# Patient Record
Sex: FEMALE | Race: WHITE | Employment: OTHER | URBAN - METROPOLITAN AREA
[De-identification: names, ages, dates, MRNs, and addresses within clinical notes are randomized per-mention and may not be internally consistent; named-entity substitution may affect disease eponyms.]

---

## 2023-07-24 ENCOUNTER — OFFICE VISIT (OUTPATIENT)
Dept: INTERNAL MEDICINE CLINIC | Facility: CLINIC | Age: 63
End: 2023-07-24
Payer: MEDICARE

## 2023-07-24 VITALS
OXYGEN SATURATION: 99 % | SYSTOLIC BLOOD PRESSURE: 100 MMHG | TEMPERATURE: 98 F | WEIGHT: 110.19 LBS | HEART RATE: 71 BPM | BODY MASS INDEX: 17.71 KG/M2 | DIASTOLIC BLOOD PRESSURE: 60 MMHG | RESPIRATION RATE: 16 BRPM | HEIGHT: 66 IN

## 2023-07-24 DIAGNOSIS — F32.5 MAJOR DEPRESSIVE DISORDER WITH SINGLE EPISODE, IN FULL REMISSION (HCC): ICD-10-CM

## 2023-07-24 DIAGNOSIS — H61.23 BILATERAL IMPACTED CERUMEN: ICD-10-CM

## 2023-07-24 DIAGNOSIS — M79.641 BILATERAL HAND PAIN: ICD-10-CM

## 2023-07-24 DIAGNOSIS — Z78.0 POSTMENOPAUSAL ESTROGEN DEFICIENCY: ICD-10-CM

## 2023-07-24 DIAGNOSIS — M79.642 BILATERAL HAND PAIN: ICD-10-CM

## 2023-07-24 DIAGNOSIS — Z23 NEED FOR VACCINATION AGAINST STREPTOCOCCUS PNEUMONIAE: ICD-10-CM

## 2023-07-24 DIAGNOSIS — Z00.00 WELLNESS EXAMINATION: Primary | ICD-10-CM

## 2023-07-24 DIAGNOSIS — S76.319A: ICD-10-CM

## 2023-07-24 DIAGNOSIS — Z12.31 SCREENING MAMMOGRAM FOR BREAST CANCER: ICD-10-CM

## 2023-07-24 DIAGNOSIS — Z12.83 SKIN CANCER SCREENING: ICD-10-CM

## 2023-07-24 LAB
ATRIAL RATE: 62 BPM
P AXIS: 81 DEGREES
P-R INTERVAL: 158 MS
Q-T INTERVAL: 420 MS
QRS DURATION: 86 MS
QTC CALCULATION (BEZET): 426 MS
R AXIS: 78 DEGREES
T AXIS: 62 DEGREES
VENTRICULAR RATE: 62 BPM

## 2023-07-24 PROCEDURE — 3008F BODY MASS INDEX DOCD: CPT | Performed by: INTERNAL MEDICINE

## 2023-07-24 PROCEDURE — 3078F DIAST BP <80 MM HG: CPT | Performed by: INTERNAL MEDICINE

## 2023-07-24 PROCEDURE — 90677 PCV20 VACCINE IM: CPT | Performed by: INTERNAL MEDICINE

## 2023-07-24 PROCEDURE — G0402 INITIAL PREVENTIVE EXAM: HCPCS | Performed by: INTERNAL MEDICINE

## 2023-07-24 PROCEDURE — G0403 EKG FOR INITIAL PREVENT EXAM: HCPCS | Performed by: INTERNAL MEDICINE

## 2023-07-24 PROCEDURE — 96160 PT-FOCUSED HLTH RISK ASSMT: CPT | Performed by: INTERNAL MEDICINE

## 2023-07-24 PROCEDURE — 3074F SYST BP LT 130 MM HG: CPT | Performed by: INTERNAL MEDICINE

## 2023-07-24 PROCEDURE — G0009 ADMIN PNEUMOCOCCAL VACCINE: HCPCS | Performed by: INTERNAL MEDICINE

## 2023-07-24 RX ORDER — BUPROPION HYDROCHLORIDE 300 MG/1
300 TABLET ORAL EVERY MORNING
COMMUNITY
Start: 2023-02-11 | End: 2023-07-24

## 2023-07-24 RX ORDER — CHOLECALCIFEROL (VITAMIN D3) 50 MCG
1 TABLET ORAL DAILY
COMMUNITY

## 2023-07-24 RX ORDER — METRONIDAZOLE 7.5 MG/G
GEL TOPICAL AS NEEDED
COMMUNITY

## 2023-07-24 RX ORDER — BUPROPION HYDROCHLORIDE 300 MG/1
300 TABLET ORAL EVERY MORNING
Qty: 90 TABLET | Refills: 3 | Status: SHIPPED | OUTPATIENT
Start: 2023-07-24

## 2023-07-24 NOTE — PATIENT INSTRUCTIONS
Flu shot and COVID vaccine this fall    Tdap tetanus shot recommended-get at pharmacy    Shingrix shingles shot recommended - get at pharmacy

## 2023-07-25 ENCOUNTER — TELEPHONE (OUTPATIENT)
Dept: INTERNAL MEDICINE CLINIC | Facility: CLINIC | Age: 63
End: 2023-07-25

## 2023-07-25 NOTE — TELEPHONE ENCOUNTER
Signed ROR faxed to patients previous PCP Jacob Maldonado MD to obtain records   Confirmation received   Sent to scan and copy placed in accordion     Awaiting records

## 2023-07-29 PROBLEM — M79.641 BILATERAL HAND PAIN: Status: ACTIVE | Noted: 2023-07-29

## 2023-07-29 PROBLEM — F32.A DEPRESSION: Status: ACTIVE | Noted: 2023-07-29

## 2023-07-29 PROBLEM — M79.642 BILATERAL HAND PAIN: Status: ACTIVE | Noted: 2023-07-29

## 2023-07-29 PROBLEM — H61.23 BILATERAL IMPACTED CERUMEN: Status: ACTIVE | Noted: 2023-07-29

## 2023-07-29 PROBLEM — S76.319A STRAIN OF PIRIFORMIS MUSCLE: Status: ACTIVE | Noted: 2023-07-29

## 2023-07-31 ENCOUNTER — TELEPHONE (OUTPATIENT)
Dept: PHYSICAL THERAPY | Facility: HOSPITAL | Age: 63
End: 2023-07-31

## 2023-08-02 ENCOUNTER — TELEPHONE (OUTPATIENT)
Dept: PHYSICAL THERAPY | Facility: HOSPITAL | Age: 63
End: 2023-08-02

## 2023-08-09 ENCOUNTER — OFFICE VISIT (OUTPATIENT)
Dept: PHYSICAL THERAPY | Age: 63
End: 2023-08-09
Attending: INTERNAL MEDICINE
Payer: MEDICARE

## 2023-08-09 PROCEDURE — 97110 THERAPEUTIC EXERCISES: CPT

## 2023-08-09 PROCEDURE — 97161 PT EVAL LOW COMPLEX 20 MIN: CPT

## 2023-08-21 ENCOUNTER — OFFICE VISIT (OUTPATIENT)
Dept: PHYSICAL THERAPY | Age: 63
End: 2023-08-21
Attending: INTERNAL MEDICINE
Payer: MEDICARE

## 2023-08-21 ENCOUNTER — HOSPITAL ENCOUNTER (OUTPATIENT)
Dept: BONE DENSITY | Age: 63
Discharge: HOME | End: 2023-08-21
Attending: INTERNAL MEDICINE
Payer: MEDICARE

## 2023-08-21 ENCOUNTER — HOSPITAL ENCOUNTER (OUTPATIENT)
Dept: MAMMOGRAPHY | Age: 63
End: 2023-08-21
Attending: INTERNAL MEDICINE
Payer: MEDICARE

## 2023-08-21 DIAGNOSIS — M79.641 BILATERAL HAND PAIN: Primary | ICD-10-CM

## 2023-08-21 DIAGNOSIS — M79.642 BILATERAL HAND PAIN: Primary | ICD-10-CM

## 2023-08-21 DIAGNOSIS — Z78.0 POSTMENOPAUSAL ESTROGEN DEFICIENCY: ICD-10-CM

## 2023-08-21 DIAGNOSIS — S76.319A: ICD-10-CM

## 2023-08-21 PROCEDURE — 97110 THERAPEUTIC EXERCISES: CPT

## 2023-08-21 PROCEDURE — 97140 MANUAL THERAPY 1/> REGIONS: CPT

## 2023-08-21 PROCEDURE — 97112 NEUROMUSCULAR REEDUCATION: CPT

## 2023-08-21 PROCEDURE — 77080 DXA BONE DENSITY AXIAL: CPT | Performed by: INTERNAL MEDICINE

## 2023-08-21 NOTE — PROGRESS NOTES
Diagnosis:   Strain of piriformis muscle, unspecified laterality, initial encounter (K45.125H)        Referring Provider: Lindsay Lamar  Date of Evaluation:    8/9/2023    Precautions:  None Next MD visit:   none scheduled  Date of Surgery: n/a   Insurance Primary/Secondary: [facility] / N/A     # Auth Visits: 10 approved 11/9            Subjective: no new problems. States that she has been working on [employer] 3x/day and notices only slight improvement in her pain. Has been walking 3 miles/day and no significant changes in her pain with walking yet. She does feel more benefit from doing HEP exercises after her walk vs before. Pain: 1/10 right hip/buttock      Objective:   Lumbar AROM: (* denotes performed with pain)  Flexion: 80 deg  Extension: 35 deg  Sidebending: R 35 deg; L 25 deg  Rotation: R 75%; L 75%    Assessment: completed todays treatment without c/o increased pain or discomfort following treatment. Good tolerance for strengthening and stabilization exercises today. Lumbar flexion full AROM and pain free today. Trialed lumbar extension and good tolerance. Still has pain with walking and pain unchanged after treatment today. Educated pt regarding Delayed onset muscle soreness and proper form for dead lifts. Pt states understanding.        Goals:   (to be met in 10 visits)   Pt will improve transversus abdominis recruitment to perform proper isometric contraction without requiring verbal or tactile cuing to promote advancement of therex   Pt will demonstrate good understanding of proper posture and body mechanics to decrease pain and improve spinal safety   Pt will improve lumbar spine AROM flexion to >80 deg pain free to allow increase ease with bending forward to don shoes   Pt will report improved symptom centralization and absence of radicular symptoms into hip for 3 consecutive days to improve function with ADL   Pt will have decreased paraspinal mm tension to tolerate standing >60 minutes for work and home activities   Pt will demonstrate improved core strength to be able to perform running with <1/10 pain   Pt will be independent and compliant with comprehensive HEP to maintain progress achieved in PT     Plan: continue POC> Progress as tolerated. Date: 8/21/2023  TX#: 2/10 Date:                 TX#: 3/ Date:                 TX#: 4/ Date:                 TX#: 5/ Date:    Tx#: 6/   NU step L2 x6 min       RTB lateral and monster band walking 35ft x 2 ea       Refomer 4 bands TrA with double knee ext 3x20 reps       TRX squats x10       Prone press ups x10       Hkly: pelvic tilt 10x10 sec       Supine: piriformis stretch in figure 4 and knee to opp sh 3x15 sec ea  Supine SKTC 5 x20 sec       Hkly: bilat hip abd RTB 2x10       Hkly: bridges 10x 5 sec       S/l: clamshells and hip abd x10 ea       Right hip mulligan mobilizations Gr 3 lateral and inferior glides 6 x10 sec bouts  Right hip long axis distraction 5x10 sec bouts       STM right piriformis x 5 min       HEP: 8/21/2023 continue with HEP    Charges: Ex 1 NReed 1 MT 1       Total Timed Treatment: 45 min  Total Treatment Time: 45 min

## 2023-08-23 ENCOUNTER — OFFICE VISIT (OUTPATIENT)
Dept: PHYSICAL THERAPY | Age: 63
End: 2023-08-23
Attending: INTERNAL MEDICINE
Payer: MEDICARE

## 2023-08-23 PROCEDURE — 97110 THERAPEUTIC EXERCISES: CPT

## 2023-08-23 PROCEDURE — 97140 MANUAL THERAPY 1/> REGIONS: CPT

## 2023-08-23 PROCEDURE — 97112 NEUROMUSCULAR REEDUCATION: CPT

## 2023-08-23 NOTE — PROGRESS NOTES
Diagnosis:   Strain of piriformis muscle, unspecified laterality, initial encounter (U24.197U)        Referring Provider: Harrison Ibrahim  Date of Evaluation:    8/9/2023    Precautions:  None Next MD visit:   none scheduled  Date of Surgery: n/a   Insurance Primary/Secondary: [facility] / N/A     # Auth Visits: 10 approved 11/9            Subjective: no new problems. States that she is feeling better with HEP. Pain has decreased alot. Still feels some discomfort in hip with uphill walking but dissipates shortly after the walk. Did have a bone density on Monday and the result show osteoperosis in her spine. Will f/u with GP for tx options. Pain: 0/10 right hip/buttock      Objective:   Lumbar AROM: (* denotes performed with pain)  Flexion: 80 deg  Extension: 35 deg  Sidebending: R 35 deg; L 25 deg  Rotation: R 75%; L 75%    Assessment: completed todays treatment without c/o increased pain or discomfort following treatment. Good tolerance for strengthening and stabilization progressions today. Updated HEP to include band walking, squats, and RTB for clamshells and s/l hip abd. Reviewed exercise benefits for osteoperosis and to include weight bearing exercises and weights, also educated on avoid flexion and flexion loading exercises.   Denies pain post treatment      Goals:   (to be met in 10 visits)   Pt will improve transversus abdominis recruitment to perform proper isometric contraction without requiring verbal or tactile cuing to promote advancement of therex   Pt will demonstrate good understanding of proper posture and body mechanics to decrease pain and improve spinal safety   Pt will improve lumbar spine AROM flexion to >80 deg pain free to allow increase ease with bending forward to don shoes   Pt will report improved symptom centralization and absence of radicular symptoms into hip for 3 consecutive days to improve function with ADL   Pt will have decreased paraspinal mm tension to tolerate standing >60 minutes for work and home activities   Pt will demonstrate improved core strength to be able to perform running with <1/10 pain   Pt will be independent and compliant with comprehensive HEP to maintain progress achieved in PT     Plan: continue POC> Progress as tolerated. Date: 8/21/2023  TX#: 2/10 Date: 8/23/2023               TX#: 3/10 Date:                 TX#: 4/ Date:                 TX#: 5/ Date:    Tx#: 6/   NU step L2 x6 min NU step L2 x6 min      RTB lateral and monster band walking 35ft x 2 ea RTB lateral and monster band walking 35ft x 2 ea      Refomer 4 bands TrA with double knee ext 3x20 reps Refomer 5 bands TrA with double knee ext 3x20 reps      TRX squats x10 TRX squats 2x10       TRX L/R lateral lunges x10 ea  TRX L/R reverse lunges x10 ea       [employer] orange band central L/R lateral x20 ea      Prone press ups x10 Prone on elbows x10      Hkly: pelvic tilt 10x10 sec Hkly: pelvic tilt 10x10 sec      Supine: piriformis stretch in figure 4 and knee to opp sh 3x15 sec ea  Supine SKTC 5 x20 sec Supine: piriformis stretch in figure 4 and knee to opp sh 3x15 sec ea      Hkly: bilat hip abd RTB 2x10 Hkly: bilat hip abd RTB 2x10      Hkly: bridges 10x 5 sec Hkly: bridges 20x 5 sec      S/l: clamshells and hip abd x10 ea S/l: RTB clamshells and hip abd x10 ea      Right hip mulligan mobilizations Gr 3 lateral and inferior glides 6 x10 sec bouts  Right hip long axis distraction 5x10 sec bouts Right hip mulligan mobilizations Gr 3 lateral and inferior glides 6 x10 sec bouts  Right hip long axis distraction 5x10 sec bouts      STM right piriformis x 5 min STM right piriformis x 5 min      HEP: 8/21/2023 continue with HEP  8/23/2023  HEP exercise additions will be in bold in flow sheet of day prescribed and continue to be in bold unless unbolded from flow sheet    Charges: Ex 1 NReed 1 MT 1       Total Timed Treatment: 45 min  Total Treatment Time: 45 min

## 2023-08-29 ENCOUNTER — OFFICE VISIT (OUTPATIENT)
Dept: PHYSICAL THERAPY | Age: 63
End: 2023-08-29
Attending: INTERNAL MEDICINE
Payer: MEDICARE

## 2023-08-29 PROCEDURE — 97110 THERAPEUTIC EXERCISES: CPT

## 2023-08-29 PROCEDURE — 97112 NEUROMUSCULAR REEDUCATION: CPT

## 2023-08-30 ENCOUNTER — HOSPITAL ENCOUNTER (OUTPATIENT)
Dept: MAMMOGRAPHY | Age: 63
Discharge: HOME | End: 2023-08-30
Attending: INTERNAL MEDICINE
Payer: MEDICARE

## 2023-08-30 DIAGNOSIS — Z12.31 SCREENING MAMMOGRAM FOR BREAST CANCER: ICD-10-CM

## 2023-08-30 PROCEDURE — 77067 SCR MAMMO BI INCL CAD: CPT | Performed by: INTERNAL MEDICINE

## 2023-08-30 PROCEDURE — 77063 BREAST TOMOSYNTHESIS BI: CPT | Performed by: INTERNAL MEDICINE

## 2023-08-31 ENCOUNTER — OFFICE VISIT (OUTPATIENT)
Dept: PHYSICAL THERAPY | Age: 63
End: 2023-08-31
Attending: INTERNAL MEDICINE
Payer: MEDICARE

## 2023-08-31 ENCOUNTER — APPOINTMENT (OUTPATIENT)
Dept: PHYSICAL THERAPY | Age: 63
End: 2023-08-31
Attending: INTERNAL MEDICINE
Payer: MEDICARE

## 2023-08-31 PROCEDURE — 97110 THERAPEUTIC EXERCISES: CPT

## 2023-08-31 PROCEDURE — 97112 NEUROMUSCULAR REEDUCATION: CPT

## 2023-09-01 ENCOUNTER — APPOINTMENT (OUTPATIENT)
Dept: PHYSICAL THERAPY | Age: 63
End: 2023-09-01
Attending: INTERNAL MEDICINE
Payer: MEDICARE

## 2023-09-06 ENCOUNTER — OFFICE VISIT (OUTPATIENT)
Dept: PHYSICAL THERAPY | Age: 63
End: 2023-09-06
Attending: INTERNAL MEDICINE
Payer: MEDICARE

## 2023-09-06 PROCEDURE — 97140 MANUAL THERAPY 1/> REGIONS: CPT

## 2023-09-06 PROCEDURE — 97110 THERAPEUTIC EXERCISES: CPT

## 2023-09-06 NOTE — PROGRESS NOTES
Diagnosis:   Strain of piriformis muscle, unspecified laterality, initial encounter (Z70.254B)        Referring Provider: Alicia Aguillon  Date of Evaluation:    8/9/2023    Precautions:  None Next MD visit:   none scheduled  Date of Surgery: n/a   Insurance Primary/Secondary: [facility] / N/A     # Auth Visits: 10 approved 11/9            Subjective: no new problems. States that she tried jogging on flat and slight incline with a little increased speed and felt the pain come back again a little more intense than last time so she decided to walk. Feels the GTB for band walking is giving a lot of resistance and makes hips sore for a bit after finishing. Pain: 1/10 right hip/buttock      Objective:   Lumbar AROM: (* denotes performed with pain)  Flexion: 80 deg  Extension: 35 deg  Sidebending: R 35 deg; L 25 deg  Rotation: R 75%; L 75%    Assessment: completed todays treatment without c/o increased pain or discomfort following treatment. Advised patient to only increases 1 variable at a time when progressing jogging at home (ie either speed or incline) not both. Pt states understanding. Educated on hip strengthening with GTB for band walking to take medium steps and good to feel burn/muscle working as that improves strength. Responded well to hip mobilizations today. Denies pain post treatment.     Goals:   (to be met in 10 visits)   Pt will improve transversus abdominis recruitment to perform proper isometric contraction without requiring verbal or tactile cuing to promote advancement of therex   Pt will demonstrate good understanding of proper posture and body mechanics to decrease pain and improve spinal safety   Pt will improve lumbar spine AROM flexion to >80 deg pain free to allow increase ease with bending forward to don shoes   Pt will report improved symptom centralization and absence of radicular symptoms into hip for 3 consecutive days to improve function with ADL   Pt will have decreased paraspinal mm tension to tolerate standing >60 minutes for work and home activities   Pt will demonstrate improved core strength to be able to perform running with <1/10 pain   Pt will be independent and compliant with comprehensive HEP to maintain progress achieved in PT     Plan: continue POC> Progress as tolerated.   Date:  8/29/2023              TX#: 4/10 Date: 8/31/2023                TX#: 5/10 Date: 9/6/2023  Tx#: 6/10   Nu step L3 x6 min Nu step L3 x5 min Elliptical L1 x5 min   RTB lateral and monster band walking 35ft x 4 ea GTB lateral and monster band walking 35ft x 4 ea GTB lateral and monster band walking 35ft x 4 ea   Refomer 5 bands TrA with double knee ext 3x20 reps Refomer 5 bands TrA with double knee ext 3x20 reps Refomer 5 bands TrA with double knee ext 3x20 reps    Reformer R/L SL 3 bands 3x10 reps ea Reformer R/L SL 3 bands 3x10 reps ea   TRX squats 2x10 TRX squats 2x10 TRX squats 2x10   TRX L/R lateral lunges x10 ea  TRX L/R reverse lunges x10 ea TRX L/R lateral lunges x10 ea  TRX L/R reverse lunges x10 ea TRX L/R lateral lunges x10 ea  TRX L/R reverse lunges x10 ea   ASU R/L LE 6\" step x10 ea ASU R/L LE 6\" step x10 ea ASU R/L LE 6\" step x15 ea     Standing lumbar ext x10     Self Piriformis STM with FR firgure 4 sitting on FR 2x10 reps   Prone on elbows x10 Prone on elbows x10 Prone quad stretch 3x20 sec  Prone: R hip IR/ER PROM x10 ea   Hkly: pelvic tilt 10x10 sec Hkly: pelvic tilt 10x10 sec Prone: Hip PA mobilizations in neutral and figure 4 6 x10 sec bouts   Supine: piriformis stretch in figure 4 and knee to opp sh 3x15 sec ea Supine: piriformis stretch in figure 4 and knee to opp sh 3x20 sec ea Supine: mulligan belt mobilizations R hip lateral and inferior 5 x10 sec bouts   Hkly: bilat hip abd RTB 2x10 Hkly: bilat hip abd GTB 2x10     Hkly: bridges 20x 5 sec    S/l: RTB clamshells and hip abd x10 ea S/l: RTB clamshells and hip abd x10 ea    --     -- STM right piriformis x 5 min    HEP: 8/21/2023 continue with HEP  8/23/2023  HEP exercise additions will be in bold in flow sheet of day prescribed and continue to be in bold unless unbolded from flow sheet    Charges: Ex 2 MT 1      Total Timed Treatment: 45 min  Total Treatment Time: 45 min

## 2023-09-11 ENCOUNTER — OFFICE VISIT (OUTPATIENT)
Dept: OCCUPATIONAL MEDICINE | Age: 63
End: 2023-09-11
Attending: INTERNAL MEDICINE
Payer: MEDICARE

## 2023-09-11 DIAGNOSIS — M79.641 BILATERAL HAND PAIN: Primary | ICD-10-CM

## 2023-09-11 DIAGNOSIS — M79.642 BILATERAL HAND PAIN: Primary | ICD-10-CM

## 2023-09-11 PROCEDURE — 97165 OT EVAL LOW COMPLEX 30 MIN: CPT

## 2023-09-11 PROCEDURE — 97110 THERAPEUTIC EXERCISES: CPT

## 2023-09-13 ENCOUNTER — OFFICE VISIT (OUTPATIENT)
Dept: OCCUPATIONAL MEDICINE | Age: 63
End: 2023-09-13
Attending: INTERNAL MEDICINE
Payer: MEDICARE

## 2023-09-13 ENCOUNTER — OFFICE VISIT (OUTPATIENT)
Dept: PHYSICAL THERAPY | Age: 63
End: 2023-09-13
Attending: INTERNAL MEDICINE
Payer: MEDICARE

## 2023-09-13 PROCEDURE — 97018 PARAFFIN BATH THERAPY: CPT

## 2023-09-13 PROCEDURE — 97110 THERAPEUTIC EXERCISES: CPT

## 2023-09-13 PROCEDURE — 97530 THERAPEUTIC ACTIVITIES: CPT

## 2023-09-13 PROCEDURE — 97140 MANUAL THERAPY 1/> REGIONS: CPT

## 2023-09-18 ENCOUNTER — OFFICE VISIT (OUTPATIENT)
Dept: OCCUPATIONAL MEDICINE | Age: 63
End: 2023-09-18
Attending: INTERNAL MEDICINE
Payer: MEDICARE

## 2023-09-18 ENCOUNTER — APPOINTMENT (OUTPATIENT)
Dept: PHYSICAL THERAPY | Age: 63
End: 2023-09-18
Attending: INTERNAL MEDICINE
Payer: MEDICARE

## 2023-09-18 PROCEDURE — 97110 THERAPEUTIC EXERCISES: CPT

## 2023-09-18 PROCEDURE — 97530 THERAPEUTIC ACTIVITIES: CPT

## 2023-09-18 NOTE — PROGRESS NOTES
Saima  Pt has attended 4 visits in Occupational Therapy. Diagnosis:   Bilateral hand pain      Referring Provider: Alex Jaquez  Date of Evaluation:    9/11/23    Precautions:  None  Next MD visit: none  DOI: 1/2023  Date of Surgery: n/a   Insurance Primary/Secondary: [facility] / N/A     # Auth Visits: 10          Orders:   Order Date:  7/24/2023  Authorizing Provider:   Leilani Medina     Procedure:  OP REFERRAL TO [address] [place] [address] [[id number]]         Order #:   [id number]  Qty:  1     Priority:  Routine                   Class:   IHP - RFL     Standing Interval:          Standing Occurrences:          Expires on:            Expected by:    Associated DX: Bilateral hand pain (M79.641,M79.642)     Order summary:  IHP - RFL, Routine, 8 visits  Occupational  Therapy Area of Concentration: Orthopedic  Occupational Therapy Ortho: UE  If the patient can be seen sooner with a PT vs an OT, can we cancel this order and replace with a PT order? Yes        SUBJECTIVE:  Reports doing HEP with a new routine. Reports ordering the splint for the left hand. Pain:   2/10 left thumb thenar        OBJECTIVE:       OUTCOME MEASURE   (Initial Eval):   QuickDASH Outcome Score  Score: 6.82 % (9/4/2023 10:36 AM)    Outcome Measure:  Discharge  Post QuickDASH Outcome Score  Post Score: 27.27 % (9/20/2023 11:10 AM)    -20.45 % improvement: now more aware of self limitations        Strength (lbs)  Right/Left Right  9/11/23 Left   9/11/23    59 59   3 Point Pinch  15 16   Lateral Pinch 13 12           Date 9/11/23 9/13/23 9/18/23 9/20/23   Visit # 1  2  3 4   # of visits authorized: 9/11-12/11/23 TBD 10 10 10   # of visits in OT POC:  4 4 4 4   Evaluation Initial X      Progress Report written         Manual Therapy                                         Ther ex           thenar isometrics Tennis ball  tennis ball      First DI Rubber band  rubber band   Reviewed    Putty        yellow  and pinch Red clip   First web space stretches   Chip clip one minute to right hand  reviewed Chip clip    Balanced thumb posture training   Taught with paraffin ball  reviewed reviewed                                   HEP/  Patient education topics See table below  chip clip first web/DI stretches, use of paraffin  putty, avoid push up's, modified UE weight bearing with built up Final HEP   Therapeutic Activity         Joint protection training Issued handout Training performed How to apply when working out at gym Reviewed                                        Objective measurements taken and reviewed with patient  see above        Neuromuscular Re-education                                        Orthotic fitting and training   Reviewed splint options Reports having purchased one   Taping Rock tape applied to left thumb CMC and MP Rock tape applied to left thumb CMC and MP Rock tape applied to left thumb, teaching patient self application Rock tape applied in modified finger web to right thumb and in support    Modalities Hot pack 5 minutes to both hands Hot pack on left and paraffin on right for 5 minutes Hot pack 5 minutes to both hands Hot pack 5 minutes to both hands   X= performed this date as previously outlined      HEP Date issued  Date amended Date discharged  Comments ([address] [place] [address] code if used)   Tennis ball: thenar muscles 9/11/23      First DI 9/11/23      Issued soft putty for  and pinch 9/18/23                                        ASSESSMENT    Patient is a 71 yo female, who has been seen in Occupational Therapy since initial eval on 9/11/23, with last treatment session on 9/20/23. The patient has attended 4/4 scheduled appointments, with 0 no shows and 0 cancellations.   Focus of skilled OT has been on strength and stability at the thumbs, with use of interventions including therapeutic activities, therapeutic exercises, modalities such as paraffin, hot packs, adapted ADL training, neuromuscular re-ed, and taping to achieve the functional goals listed below. She has shown improvement in self management of arthritis symptoms, with functional improvements reported as in gripping, pinching and in daily occupation engagement. Limitations and barriers toward progress include: chronicity of diagnosis. She has met the goals as noted below, reaching maximal benefit from skilled OT, and is ready for transition to home program at this time. Goals: (to be met in 4 visits)  Patient will be independent and compliant with comprehensive HEP to maintain progress achieved in OT. (Met)  Patient will demonstrate balanced posture in thumb joints during three point pinch test and during pinch of ADL tools. (Met)  Patient will verbalize two concepts of joint protection and describe personal application of such in ADL's or IADL's. (Met)  Patient will trial and/or participate in discussion of thumb splint options. (Met)        PLAN:  Discharge OT and continue with self management.       Charges: 3 TE    Total Timed Treatment: 40 minutes    Total Treatment Time: 45 minutes  Fleet Cam, MHS, OTR/L, CHT

## 2023-09-20 ENCOUNTER — APPOINTMENT (OUTPATIENT)
Dept: PHYSICAL THERAPY | Age: 63
End: 2023-09-20
Attending: INTERNAL MEDICINE
Payer: MEDICARE

## 2023-09-20 ENCOUNTER — OFFICE VISIT (OUTPATIENT)
Dept: OCCUPATIONAL MEDICINE | Age: 63
End: 2023-09-20
Attending: INTERNAL MEDICINE
Payer: MEDICARE

## 2023-09-20 PROCEDURE — 97110 THERAPEUTIC EXERCISES: CPT

## 2023-09-25 ENCOUNTER — APPOINTMENT (OUTPATIENT)
Dept: PHYSICAL THERAPY | Age: 63
End: 2023-09-25
Attending: INTERNAL MEDICINE
Payer: MEDICARE

## 2023-09-27 ENCOUNTER — APPOINTMENT (OUTPATIENT)
Dept: OCCUPATIONAL MEDICINE | Age: 63
End: 2023-09-27
Attending: INTERNAL MEDICINE
Payer: MEDICARE

## 2023-09-29 ENCOUNTER — APPOINTMENT (OUTPATIENT)
Dept: OCCUPATIONAL MEDICINE | Age: 63
End: 2023-09-29
Attending: INTERNAL MEDICINE
Payer: MEDICARE

## 2023-10-02 ENCOUNTER — APPOINTMENT (OUTPATIENT)
Dept: OCCUPATIONAL MEDICINE | Age: 63
End: 2023-10-02
Attending: INTERNAL MEDICINE
Payer: MEDICARE

## 2023-10-04 ENCOUNTER — APPOINTMENT (OUTPATIENT)
Dept: OCCUPATIONAL MEDICINE | Age: 63
End: 2023-10-04
Attending: INTERNAL MEDICINE
Payer: MEDICARE

## 2023-10-05 ENCOUNTER — OFFICE VISIT (OUTPATIENT)
Dept: PHYSICAL THERAPY | Age: 63
End: 2023-10-05
Attending: INTERNAL MEDICINE
Payer: MEDICARE

## 2023-10-05 PROCEDURE — 97110 THERAPEUTIC EXERCISES: CPT

## 2023-10-05 PROCEDURE — 97140 MANUAL THERAPY 1/> REGIONS: CPT

## 2023-10-05 NOTE — PROGRESS NOTES
Diagnosis:   Strain of piriformis muscle, unspecified laterality, initial encounter (Y34.342A)        Referring Provider: Brittany Gamez  Date of Evaluation:    8/9/2023    Precautions:  None Next MD visit:   none scheduled  Date of Surgery: n/a   Insurance Primary/Secondary: [facility] / N/A     # Auth Visits: 10 approved 11/9            Subjective: no new problems. States that she has been increasing her activity with jogging (flat and incline) and she has not been feeling as much discomfort in her hip. Getting up to 1.25 miles--flat and incline. Yesterday was the first time she tried the incline and she didn't feel pain/discomfort during or afterwards but did have some soreness this morning during her power walk (VAS 2/10). Pain: 1/10 right hip/buttock      Objective:   Lumbar AROM: (* denotes performed with pain)  Flexion: 80 deg  Extension: 35 deg  Sidebending: R 35 deg; L 25 deg  Rotation: R 75%; L 75%    Assessment: completed todays treatment without c/o increased pain or discomfort following treatment. Making good progress with LE strength,LP stability and hip mobility. Jogging with less pain and increased distance. Advised pt to continue with slowing increasing distance as tolerated and assure she is stretching pre post exercise/jogging. Pt states understanding. Denies pain post treatment.      Goals:   (to be met in 10 visits)   Pt will improve transversus abdominis recruitment to perform proper isometric contraction without requiring verbal or tactile cuing to promote advancement of therex   Pt will demonstrate good understanding of proper posture and body mechanics to decrease pain and improve spinal safety   Pt will improve lumbar spine AROM flexion to >80 deg pain free to allow increase ease with bending forward to don shoes   Pt will report improved symptom centralization and absence of radicular symptoms into hip for 3 consecutive days to improve function with ADL   Pt will have decreased paraspinal mm tension to tolerate standing >60 minutes for work and home activities   Pt will demonstrate improved core strength to be able to perform running with <1/10 pain   Pt will be independent and compliant with comprehensive HEP to maintain progress achieved in PT     Plan: continue POC> Progress as tolerated.   Date:  8/29/2023              TX#: 4/10 Date: 8/31/2023                TX#: 5/10 Date: 9/6/2023  Tx#: 6/10 Date: 9/13/2023  Tx# 7/10 Date: 10/5/2023  Tx# 8/10   Nu step L3 x6 min Nu step L3 x5 min Elliptical L1 x5 min Elliptical L1 x5 min Elliptical L1 x5 min   RTB lateral and monster band walking 35ft x 4 ea GTB lateral and monster band walking 35ft x 4 ea GTB lateral and monster band walking 35ft x 4 ea GTB lateral and monster band walking 35ft x 4 ea GTB lateral and monster band walking 35ft x 4 ea   Refomer 5 bands TrA with double knee ext 3x20 reps Refomer 5 bands TrA with double knee ext 3x20 reps Refomer 5 bands TrA with double knee ext 3x20 reps Refomer 5 bands TrA with double knee ext 3x20 reps Refomer 5 bands TrA with double knee ext 3x20 reps    Reformer R/L SL 3 bands 3x10 reps ea Reformer R/L SL 3 bands 3x10 reps ea Reformer R/L SL 4 bands 3x10 reps ea Reformer R/L SL 4 bands 3x10 reps ea   TRX squats 2x10 TRX squats 2x10 TRX squats 2x10 TRX squats 2x10 TRX squats 2x10   TRX L/R lateral lunges x10 ea  TRX L/R reverse lunges x10 ea TRX L/R lateral lunges x10 ea  TRX L/R reverse lunges x10 ea TRX L/R lateral lunges x10 ea  TRX L/R reverse lunges x10 ea TRX L/R lateral lunges x10 ea  TRX L/R reverse lunges x10 ea TRX L/R lateral lunges x12 ea  TRX L/R reverse lunges x12 ea   ASU R/L LE 6\" step x10 ea ASU R/L LE 6\" step x10 ea ASU R/L LE 6\" step x15 ea ASU R/L LE 8\" step x15 ea ASU R/L LE 8\" step x15 ea     Standing lumbar ext x10 Standing lumbar ext x10 Standing lumbar ext x10     Self Piriformis STM with FR firgure 4 sitting on FR 2x10 reps Self Piriformis STM with FR firgure 4 sitting on FR 2x10 reps Self Piriformis STM with FR firgure 4 sitting on FR 2x10 reps      Prone press ups x10 Prone press ups x10   Prone on elbows x10 Prone on elbows x10 Prone quad stretch 3x20 sec  Prone: R hip IR/ER PROM x10 ea Prone quad stretch 3x20 sec  Prone: R hip IR/ER PROM x10 ea Prone quad stretch 3x20 sec  Prone: R hip IR/ER PROM x10 ea   Hkly: pelvic tilt 10x10 sec Hkly: pelvic tilt 10x10 sec Prone: Hip PA mobilizations in neutral and figure 4 6 x10 sec bouts Prone: Hip PA mobilizations in neutral and figure 4 6 x10 sec bouts Prone: Hip PA mobilizations in neutral and figure 4 6 x10 sec bouts   Supine: piriformis stretch in figure 4 and knee to opp sh 3x15 sec ea Supine: piriformis stretch in figure 4 and knee to opp sh 3x20 sec ea Supine: mulligan belt mobilizations R hip lateral and inferior 5 x10 sec bouts Supine: mulligan belt mobilizations R hip lateral and inferior 5 x10 sec bouts Supine: mulligan belt mobilizations R hip lateral and inferior 5 x10 sec bouts   Hkly: bilat hip abd RTB 2x10 Hkly: bilat hip abd GTB 2x10  Hkly: bridges 20x 5 sec  R/L SL bridge x10 ea Hkly: bridges 20x 5 sec  R/L SL bridge x10 ea    Hkly: bridges 20x 5 sec      S/l: RTB clamshells and hip abd x10 ea S/l: RTB clamshells and hip abd x10 ea  S/l: GTB clamshells and hip abd x15 ea S/l: GTB clamshells and hip abd x15 ea   --       -- STM right piriformis x 5 min  STM right piriformis x 5 min STM right piriformis x 5 min   HEP: 8/21/2023 continue with HEP  8/23/2023  HEP exercise additions will be in bold in flow sheet of day prescribed and continue to be in bold unless unbolded from flow sheet    Charges: Ex 2 MT 1      Total Timed Treatment: 45 min  Total Treatment Time: 45 min

## 2023-10-12 ENCOUNTER — OFFICE VISIT (OUTPATIENT)
Dept: PHYSICAL THERAPY | Age: 63
End: 2023-10-12
Attending: INTERNAL MEDICINE
Payer: MEDICARE

## 2023-10-12 PROCEDURE — 97140 MANUAL THERAPY 1/> REGIONS: CPT

## 2023-10-12 PROCEDURE — 97110 THERAPEUTIC EXERCISES: CPT

## 2023-10-12 NOTE — PROGRESS NOTES
Diagnosis:   Strain of piriformis muscle, unspecified laterality, initial encounter (M47.959L)        Referring Provider: Lobo Bose  Date of Evaluation:    8/9/2023    Precautions:  None Next MD visit:   none scheduled  Date of Surgery: n/a   Insurance Primary/Secondary: [facility] / N/A     # Auth Visits: 10 approved 11/9            Subjective: no new problems. States that she continues to increase her activity with jogging (flat and incline) and she has not been feeling as much discomfort in her hip. Getting up to 1.50 miles--flat and incline for jogging and then walking for the rest which takes her to 3 miles total. Plans to start back at [facility] on 11/1/2023. Pain: 0/10 right hip/buttock      Objective:   Lumbar AROM: (* denotes performed with pain)  Flexion: 80 deg  Extension: 35 deg  Sidebending: R 35 deg; L 25 deg  Rotation: R 75%; L 75%    Assessment: completed todays treatment without c/o increased pain or discomfort following treatment. Making good progress with LE strength,LP stability and hip mobility. Pt increased jogging distance since last session with some increased pain as well. Advised pt to decrease distance from 1.5 miles to 1.3-1.4 miles as 1.5 miles maybe too much of an increased from 1.25 miles right now. Pt states understanding. Piriformis tenderness is decreasing with STM. Good tolerance for exercise progressions today. Denies pain post treatment.      Goals:   (to be met in 10 visits)   Pt will improve transversus abdominis recruitment to perform proper isometric contraction without requiring verbal or tactile cuing to promote advancement of therex   Pt will demonstrate good understanding of proper posture and body mechanics to decrease pain and improve spinal safety   Pt will improve lumbar spine AROM flexion to >80 deg pain free to allow increase ease with bending forward to don shoes   Pt will report improved symptom centralization and absence of radicular symptoms into hip for 3 consecutive days to improve function with ADL   Pt will have decreased paraspinal mm tension to tolerate standing >60 minutes for work and home activities   Pt will demonstrate improved core strength to be able to perform running with <1/10 pain   Pt will be independent and compliant with comprehensive HEP to maintain progress achieved in PT     Plan: continue POC> Progress as tolerated.   Date: 9/6/2023  Tx#: 6/10 Date: 9/13/2023  Tx# 7/10 Date: 10/5/2023  Tx# 8/10 Date: 10/12/2023  Tx# 9/10   Elliptical L1 x5 min Elliptical L1 x5 min Elliptical L1 x5 min Elliptical L1 x5 min   GTB lateral and monster band walking 35ft x 4 ea GTB lateral and monster band walking 35ft x 4 ea GTB lateral and monster band walking 35ft x 4 ea GTB lateral and monster band walking 35ft x 4 ea   Refomer 5 bands TrA with double knee ext 3x20 reps Refomer 5 bands TrA with double knee ext 3x20 reps Refomer 5 bands TrA with double knee ext 3x20 reps Refomer 6 bands TrA with double knee ext  feet straight and feet in V position 3x20 reps ea   Reformer R/L SL 3 bands 3x10 reps ea Reformer R/L SL 4 bands 3x10 reps ea Reformer R/L SL 4 bands 3x10 reps ea Reformer R/L SL 4 bands 3x10 reps ea   TRX squats 2x10 TRX squats 2x10 TRX squats 2x10 TRX squats 2x10   TRX L/R lateral lunges x10 ea  TRX L/R reverse lunges x10 ea TRX L/R lateral lunges x10 ea  TRX L/R reverse lunges x10 ea TRX L/R lateral lunges x12 ea  TRX L/R reverse lunges x12 ea TRX L/R lateral lunges x12 ea  TRX L/R reverse lunges x12 ea   ASU R/L LE 6\" step x15 ea ASU R/L LE 8\" step x15 ea ASU R/L LE 8\" step x15 ea ASU with SLB at top R/L LE 8\" step x15 ea   Standing lumbar ext x10 Standing lumbar ext x10 Standing lumbar ext x10 Standing lumbar ext x10      Mini squats on BOSU x10   Self Piriformis STM with FR firgure 4 sitting on FR 2x10 reps Self Piriformis STM with FR firgure 4 sitting on FR 2x10 reps Self Piriformis STM with FR firgure 4 sitting on FR 2x10 reps Self Piriformis STM with FR firgure 4 sitting on FR 2x10 reps    Prone press ups x10 Prone press ups x10    Prone quad stretch 3x20 sec  Prone: R hip IR/ER PROM x10 ea Prone quad stretch 3x20 sec  Prone: R hip IR/ER PROM x10 ea Prone quad stretch 3x20 sec  Prone: R hip IR/ER PROM x10 ea Prone quad stretch 3x20 sec  Prone: R hip IR/ER PROM x10 ea   Prone: Hip PA mobilizations in neutral and figure 4 6 x10 sec bouts Prone: Hip PA mobilizations in neutral and figure 4 6 x10 sec bouts Prone: Hip PA mobilizations in neutral and figure 4 6 x10 sec bouts Prone: Hip PA mobilizations in neutral and figure 4 6 x10 sec bouts   Supine: mulligan belt mobilizations R hip lateral and inferior 5 x10 sec bouts Supine: mulligan belt mobilizations R hip lateral and inferior 5 x10 sec bouts Supine: mulligan belt mobilizations R hip lateral and inferior 5 x10 sec bouts Supine: mulligan belt mobilizations R hip lateral and inferior 5 x10 sec bouts    Hkly: bridges 20x 5 sec  R/L SL bridge x10 ea Hkly: bridges 20x 5 sec  R/L SL bridge x10 ea Hkly: bridges 20x 5 sec  R/L SL bridge x10 ea          S/l: GTB clamshells and hip abd x15 ea S/l: GTB clamshells and hip abd x15 ea           STM right piriformis x 5 min STM right piriformis x 5 min    HEP: 8/21/2023 continue with HEP  8/23/2023  HEP exercise additions will be in bold in flow sheet of day prescribed and continue to be in bold unless unbolded from flow sheet    Charges: Ex 2 MT 1      Total Timed Treatment: 45 min  Total Treatment Time: 45 min

## 2023-10-19 ENCOUNTER — OFFICE VISIT (OUTPATIENT)
Dept: PHYSICAL THERAPY | Age: 63
End: 2023-10-19
Attending: INTERNAL MEDICINE
Payer: MEDICARE

## 2023-10-19 PROCEDURE — 97110 THERAPEUTIC EXERCISES: CPT

## 2023-10-19 PROCEDURE — 97140 MANUAL THERAPY 1/> REGIONS: CPT

## 2023-10-19 NOTE — PROGRESS NOTES
Diagnosis:   Strain of piriformis muscle, unspecified laterality, initial encounter (L69.092C)        Referring Provider: Lindsay Lamar  Date of Evaluation:    8/9/2023    Precautions:  None Next MD visit:   none scheduled  Date of Surgery: n/a   Insurance Primary/Secondary: [facility] [place] / N/A     # Auth Visits: 10 approved 11/9            Subjective: states that she went to [employer] class on Friday and thinks the leg press there aggravated her hip pain. She noticed increased hip discomfort VAS 4-5/10 on Sat. Worked on her HEP and it only relieved the discomfort a little bit. Didn't run or walk over the weekend but walked on Mon-Wed and the discomfort remained but didn't increase. Pain: 3/10 right hip/buttock      Objective:   Lumbar AROM: (* denotes performed with pain)  Flexion: 80 deg  Extension: 35 deg  Sidebending: R 35 deg; L 25 deg  Rotation: R 75%; L 75%    Assessment: completed todays treatment without c/o increased pain or discomfort following treatment. Today she had some increased right hip/buttock pain as compared to prior visits. She feels the leg press at [employer] may have been the trigger for increased pain as she pushed pretty hard during that exercise. She has regressed with her jogging since last week. She responded well to [facility] and manual hip mobilizations. Advised on use of cold pack and to continue with walking for now, avoiding inclines until her inflammation calms down. Pt states understanding. She did report improvement in her pain post treatment but not resolved.        Goals:   (to be met in 10 visits)   Pt will improve transversus abdominis recruitment to perform proper isometric contraction without requiring verbal or tactile cuing to promote advancement of therex   Pt will demonstrate good understanding of proper posture and body mechanics to decrease pain and improve spinal safety   Pt will improve lumbar spine AROM flexion to >80 deg pain free to allow increase ease with bending forward to don shoes   Pt will report improved symptom centralization and absence of radicular symptoms into hip for 3 consecutive days to improve function with ADL   Pt will have decreased paraspinal mm tension to tolerate standing >60 minutes for work and home activities   Pt will demonstrate improved core strength to be able to perform running with <1/10 pain   Pt will be independent and compliant with comprehensive HEP to maintain progress achieved in PT     Plan: continue POC> Progress as tolerated.   Date: 9/6/2023  Tx#: 6/10 Date: 9/13/2023  Tx# 7/10 Date: 10/5/2023  Tx# 8/10 Date: 10/12/2023  Tx# 9/10 Date: 10/19/2023  Tx# 10/10   Elliptical L1 x5 min Elliptical L1 x5 min Elliptical L1 x5 min Elliptical L1 x5 min Elliptical L1 x5 min   GTB lateral and monster band walking 35ft x 4 ea GTB lateral and monster band walking 35ft x 4 ea GTB lateral and monster band walking 35ft x 4 ea GTB lateral and monster band walking 35ft x 4 ea    Refomer 5 bands TrA with double knee ext 3x20 reps Refomer 5 bands TrA with double knee ext 3x20 reps Refomer 5 bands TrA with double knee ext 3x20 reps Refomer 6 bands TrA with double knee ext  feet straight and feet in V position 3x20 reps ea --   Reformer R/L SL 3 bands 3x10 reps ea Reformer R/L SL 4 bands 3x10 reps ea Reformer R/L SL 4 bands 3x10 reps ea Reformer R/L SL 4 bands 3x10 reps ea --   TRX squats 2x10 TRX squats 2x10 TRX squats 2x10 TRX squats 2x10 --   TRX L/R lateral lunges x10 ea  TRX L/R reverse lunges x10 ea TRX L/R lateral lunges x10 ea  TRX L/R reverse lunges x10 ea TRX L/R lateral lunges x12 ea  TRX L/R reverse lunges x12 ea TRX L/R lateral lunges x12 ea  TRX L/R reverse lunges x12 ea --   ASU R/L LE 6\" step x15 ea ASU R/L LE 8\" step x15 ea ASU R/L LE 8\" step x15 ea ASU with SLB at top R/L LE 8\" step x15 ea --   Standing lumbar ext x10 Standing lumbar ext x10 Standing lumbar ext x10 Standing lumbar ext x10       Mini squats on BOSU x10    Self Piriformis STM with FR firgure 4 sitting on FR 2x10 reps Self Piriformis STM with FR firgure 4 sitting on FR 2x10 reps Self Piriformis STM with FR firgure 4 sitting on FR 2x10 reps Self Piriformis STM with FR firgure 4 sitting on FR 2x10 reps --       STM/trigger point release R piriformis/glut med x8 min    Prone press ups x10 Prone press ups x10  Prone press ups with breathe out  and sag x10   Prone quad stretch 3x20 sec  Prone: R hip IR/ER PROM x10 ea Prone quad stretch 3x20 sec  Prone: R hip IR/ER PROM x10 ea Prone quad stretch 3x20 sec  Prone: R hip IR/ER PROM x10 ea Prone quad stretch 3x20 sec  Prone: R hip IR/ER PROM x10 ea Prone quad stretch 3x20 sec  Prone: R hip IR/ER PROM 2x10 ea   Prone: Hip PA mobilizations in neutral and figure 4 6 x10 sec bouts Prone: Hip PA mobilizations in neutral and figure 4 6 x10 sec bouts Prone: Hip PA mobilizations in neutral and figure 4 6 x10 sec bouts Prone: Hip PA mobilizations in neutral and figure 4 6 x10 sec bouts Prone: Hip PA mobilizations in neutral and figure 4 6 x10 sec bouts   Supine: mulligan belt mobilizations R hip lateral and inferior 5 x10 sec bouts Supine: mulligan belt mobilizations R hip lateral and inferior 5 x10 sec bouts Supine: mulligan belt mobilizations R hip lateral and inferior 5 x10 sec bouts Supine: mulligan belt mobilizations R hip lateral and inferior 5 x10 sec bouts Supine: mulligan belt mobilizations R hip lateral and inferior 5 x10 sec bouts    Hkly: bridges 20x 5 sec  R/L SL bridge x10 ea Hkly: bridges 20x 5 sec  R/L SL bridge x10 ea Hkly: bridges 20x 5 sec  R/L SL bridge x10 ea Hkly: pelvic tilt 10x10 sec  Hkly: bilat hip abd RTB with TrA 2x10           S/l: GTB clamshells and hip abd x15 ea S/l: GTB clamshells and hip abd x15 ea  s/l: RTB clamshells and hip abd x15 ea           STM right piriformis x 5 min STM right piriformis x 5 min     HEP: 8/21/2023 continue with HEP  8/23/2023  HEP exercise additions will be in bold in flow sheet of day prescribed and continue to be in bold unless unbolded from flow sheet    Charges: Ex 2 MT 1      Total Timed Treatment: 45 min  Total Treatment Time: 45 min

## 2023-10-23 ENCOUNTER — OFFICE VISIT (OUTPATIENT)
Dept: PHYSICAL THERAPY | Age: 63
End: 2023-10-23
Attending: INTERNAL MEDICINE
Payer: MEDICARE

## 2023-10-23 PROCEDURE — 97110 THERAPEUTIC EXERCISES: CPT

## 2023-10-23 NOTE — PROGRESS NOTES
Diagnosis:   Strain of piriformis muscle, unspecified laterality, initial encounter (M84.760R)        Referring Provider: Buddy Caban  Date of Evaluation:    8/9/2023    Precautions:  None Next MD visit:   none scheduled  Date of Surgery: n/a   Insurance Primary/Secondary: [facility] [place] [facility] / N/A     # Auth Visits: 10 approved 11/9            Progress Summary  Pt has attended 10 visits in Physical Therapy. Jose C Harrison reports she was feeling 50-60% better with her pain and function up until last week where she had a regression to 30-40% improvement. She still feels better than when she first started PT but had a set back last week. Her hip abduction strength has greatly improved but minimal improvement in glut med/hip ER strength. She did have a regression in her progress over the past week which she this may be from overdoing on a machine in her [employer] class but not sure. She does have relief of her pain during PT sessions with strengthening and I think her lumbar spine is a component to her pain. She responds well to extension based exercises and I have added to her HEP prone press ups with self overpressure with breathing. She will benefit from continued skilled PT to achieve goals still in progress. Objective:   LEFS Score  LEFS Score: 72.5 % (8/7/2023  1:00 PM)  LEFS score: 67.5% 10/23/2023    Lumbar AROM: (* denotes performed with pain)  Flexion: 80 deg*  Extension: 35 deg  Sidebending: R 35 deg; L 25 deg  Rotation: R 75%; L 75%     Accessory motion: L1-5 mild hypomobility  Palpation: moderate tenderness right piriformis     Strength: (* denotes performed with pain)  LE   Hip flexion (L2): R 5/5; L 5/5  Hip abduction: R 4+/5; L 4+/5            Hip ER: R 4/5; L 4/5  Hip IR: R 5/5; L 5/5     Goals:   (to be met in 10 visits)   Pt will improve transversus abdominis recruitment to perform proper isometric contraction without requiring verbal or tactile cuing to promote advancement of therex.  Met    Pt will demonstrate good understanding of proper posture and body mechanics to decrease pain and improve spinal safety. Met    Pt will improve lumbar spine AROM flexion to >80 deg pain free to allow increase ease with bending forward to don shoes. In progress   Pt will report improved symptom centralization and absence of radicular symptoms into hip for 3 consecutive days to improve function with ADL. In progress   Pt will have decreased paraspinal mm tension to tolerate standing >60 minutes for work and home activities. Met    Pt will demonstrate improved core strength to be able to perform running with <1/10 pain. In progress   Pt will be independent and compliant with comprehensive HEP to maintain progress achieved in PT. In progress     Rehab Potential: good    Plan: Continue skilled Physical Therapy POC 1 x/week or a total of 8 more visits over a 90 day period. Patient/Family/Caregiver was advised of these findings, precautions, and treatment options and has agreed to actively participate in planning and for this course of care. Thank you for your referral. If you have any questions, please contact me at Dept: [phone]. Sincerely,  Electronically signed by therapist: Arnulfo Aldridge PT    Physician's certification required: Yes  Please co-sign or sign and return this letter via fax as soon as possible to [phone]. I certify the need for these services furnished under this plan of treatment and while under my care. X___________________________________________________ Date____________________    Certification From: 11/94/8649  To:1/21/2024        Subjective: See PN  Pain: 3/10 right hip/buttock      Objective:   See PN      Assessment: See PN    Goals:   See PN    Plan: continue POC> Progress as tolerated.   Date: 10/5/2023  Tx# 8/10 Date: 10/12/2023  Tx# 9/10 Date: 10/19/2023  Tx# 10/10 DAte: 10/23/2023  Tx# 11/18   Elliptical L1 x5 min Elliptical L1 x5 min Elliptical L1 x5 min Elliptical L1 x5 min   GTB lateral and monster band walking 35ft x 4 ea GTB lateral and monster band walking 35ft x 4 ea GTB lateral and monster band walking 35ft x 4 ea GTB lateral and monster band walking 35ft x 4 ea   Refomer 5 bands TrA with double knee ext 3x20 reps Refomer 6 bands TrA with double knee ext  feet straight and feet in V position 3x20 reps ea  Refomer 6 bands TrA with double knee ext  feet straight and feet in V position 3x20 reps ea   Reformer R/L SL 4 bands 3x10 reps ea Reformer R/L SL 4 bands 3x10 reps ea  Reformer R/L SL 4 bands 3x10 reps ea   TRX squats 2x10 TRX squats 2x10  TRX squats 2x10   TRX L/R lateral lunges x12 ea  TRX L/R reverse lunges x12 ea TRX L/R lateral lunges x12 ea  TRX L/R reverse lunges x12 ea  TRX L/R lateral lunges x12 ea  TRX L/R reverse lunges x12 ea   ASU R/L LE 8\" step x15 ea ASU with SLB at top R/L LE 8\" step x15 ea  ASU with SLB at top R/L LE 8\" step x15 ea   Standing lumbar ext x10 Standing lumbar ext x10      Mini squats on BOSU x10  Mini squats on BOSU flat side x10   Self Piriformis STM with FR firgure 4 sitting on FR 2x10 reps Self Piriformis STM with FR firgure 4 sitting on FR 2x10 reps --      STM/trigger point release R piriformis/glut med x8 min    Prone press ups x10  Prone press ups with breathe out  and sag x10 Prone press ups with breathe out  and sag x10   Prone quad stretch 3x20 sec  Prone: R hip IR/ER PROM x10 ea Prone quad stretch 3x20 sec  Prone: R hip IR/ER PROM x10 ea Prone quad stretch 3x20 sec  Prone: R hip IR/ER PROM 2x10 ea    Prone: Hip PA mobilizations in neutral and figure 4 6 x10 sec bouts Prone: Hip PA mobilizations in neutral and figure 4 6 x10 sec bouts Prone: Hip PA mobilizations in neutral and figure 4 6 x10 sec bouts    Supine: mulligan belt mobilizations R hip lateral and inferior 5 x10 sec bouts Supine: mulligan belt mobilizations R hip lateral and inferior 5 x10 sec bouts Supine: mulligan belt mobilizations R hip lateral and inferior 5 x10 sec bouts Hkly: bridges 20x 5 sec  R/L SL bridge x10 ea Hkly: bridges 20x 5 sec  R/L SL bridge x10 ea Hkly: pelvic tilt 10x10 sec  Hkly: bilat hip abd RTB with TrA 2x10          S/l: GTB clamshells and hip abd x15 ea  s/l: RTB clamshells and hip abd x15 ea STM with muscle stick R glut med/piriformis x5 min         STM right piriformis x 5 min   Re-assess   HEP: 8/21/2023 continue with HEP  8/23/2023  HEP exercise additions will be in bold in flow sheet of day prescribed and continue to be in bold unless unbolded from flow sheet    Charges: Ex 3    Total Timed Treatment: 45 min  Total Treatment Time: 45 min

## 2023-10-31 ENCOUNTER — OFFICE VISIT (OUTPATIENT)
Dept: PHYSICAL THERAPY | Age: 63
End: 2023-10-31
Attending: INTERNAL MEDICINE

## 2023-10-31 PROCEDURE — 97110 THERAPEUTIC EXERCISES: CPT

## 2023-10-31 NOTE — PROGRESS NOTES
Diagnosis:   Strain of piriformis muscle, unspecified laterality, initial encounter (Q82.255V)        Referring Provider: Chris Clay  Date of Evaluation:    8/9/2023    Precautions:  None Next MD visit:   none scheduled  Date of Surgery: n/a   Insurance Primary/Secondary: [facility] / N/A     # Auth Visits: 10 approved 11/9            Subjective: No new problems. States she has walking for exercise. 3 miles on level ground aggravates her hip but has no pain with 2 miles so has been walking 2 miles without a problem over the past week. States that she had a long car drive 2 hours over the weekend and her hip bothered her for about an hour after getting out of the car. Stopped the prone press ups on Saturday because she tweaked her upper back. Upper back feels fine now will plan to restart them. Pain: 1/10 right hip/buttock      Objective:   LEFS Score  LEFS Score: 72.5 % (8/7/2023  1:00 PM)  LEFS score: 67.5% 10/23/2023    Lumbar AROM: (* denotes performed with pain)  Flexion: 80 deg*  Extension: 35 deg  Sidebending: R 35 deg; L 25 deg  Rotation: R 75%; L 75%     Accessory motion: L1-5 mild hypomobility  Palpation: moderate tenderness right piriformis     Strength: (* denotes performed with pain)  LE   Hip flexion (L2): R 5/5; L 5/5  Hip abduction: R 4+/5; L 4+/5            Hip ER: R 4/5; L 4/5  Hip IR: R 5/5; L 5/5       Assessment: Completed todays treatment without c/o increased pain or discomfort following treatment. Walking 2 miles level surface without hip pain. 3 miles causes increased hip pain. Today she demonstrated improved tolerance for strengthening. Advised pt to restart [employer] in 1-2 weeks and only walk level ground, avid rowing machine and modified floor work. Pt states understanding.  Denies pain post treatment    Goals:   (to be met in 10 visits)   Pt will improve transversus abdominis recruitment to perform proper isometric contraction without requiring verbal or tactile cuing to promote advancement of therex. Met    Pt will demonstrate good understanding of proper posture and body mechanics to decrease pain and improve spinal safety. Met    Pt will improve lumbar spine AROM flexion to >80 deg pain free to allow increase ease with bending forward to don shoes. In progress   Pt will report improved symptom centralization and absence of radicular symptoms into hip for 3 consecutive days to improve function with ADL. In progress   Pt will have decreased paraspinal mm tension to tolerate standing >60 minutes for work and home activities. Met    Pt will demonstrate improved core strength to be able to perform running with <1/10 pain. In progress   Pt will be independent and compliant with comprehensive HEP to maintain progress achieved in PT. In progress     Plan: continue POC> Progress as tolerated.   Date: 10/5/2023  Tx# 8/10 Date: 10/12/2023  Tx# 9/10 Date: 10/19/2023  Tx# 10/10 DAte: 10/23/2023  Tx# 11/18 Date: 10/31/2023  Tx# 12/18   Elliptical L1 x5 min Elliptical L1 x5 min Elliptical L1 x5 min Elliptical L1 x5 min Elliptical L1 x5 min   GTB lateral and monster band walking 35ft x 4 ea GTB lateral and monster band walking 35ft x 4 ea GTB lateral and monster band walking 35ft x 4 ea GTB lateral and monster band walking 35ft x 4 ea GTB lateral and monster band walking 35ft x 4 ea   Refomer 5 bands TrA with double knee ext 3x20 reps Refomer 6 bands TrA with double knee ext  feet straight and feet in V position 3x20 reps ea  Refomer 6 bands TrA with double knee ext  feet straight and feet in V position 3x20 reps ea Refomer 6 bands TrA with double knee ext  feet straight and feet in V position 3x20 reps ea   Reformer R/L SL 4 bands 3x10 reps ea Reformer R/L SL 4 bands 3x10 reps ea  Reformer R/L SL 4 bands 3x10 reps ea Reformer R/L SL 4 bands 3x10 reps ea   TRX squats 2x10 TRX squats 2x10  TRX squats 2x10 TRX squats 2x10   TRX L/R lateral lunges x12 ea  TRX L/R reverse lunges x12 ea TRX L/R lateral lunges x12 ea  TRX L/R reverse lunges x12 ea  TRX L/R lateral lunges x12 ea  TRX L/R reverse lunges x12 ea TRX L/R lateral lunges x12 ea  TRX L/R reverse lunges x12 ea   ASU R/L LE 8\" step x15 ea ASU with SLB at top R/L LE 8\" step x15 ea  ASU with SLB at top R/L LE 8\" step x15 ea ASU with SLB at top R/L LE 8\" step x15 ea   Standing lumbar ext x10 Standing lumbar ext x10       Mini squats on BOSU x10   Mini squats on BOSU flat side x10   Self Piriformis STM with FR firgure 4 sitting on FR 2x10 reps Self Piriformis STM with FR firgure 4 sitting on FR 2x10 reps --       STM/trigger point release R piriformis/glut med x8 min     Prone press ups x10  Prone press ups with breathe out  and sag x10 Prone press ups with breathe out  and sag x10 Prone press ups with breathe out  and sag x10   Prone quad stretch 3x20 sec  Prone: R hip IR/ER PROM x10 ea Prone quad stretch 3x20 sec  Prone: R hip IR/ER PROM x10 ea Prone quad stretch 3x20 sec  Prone: R hip IR/ER PROM 2x10 ea  Prone quad stretch 3x20 sec  Prone: R hip IR/ER PROM 2x10 ea   Prone: Hip PA mobilizations in neutral and figure 4 6 x10 sec bouts Prone: Hip PA mobilizations in neutral and figure 4 6 x10 sec bouts Prone: Hip PA mobilizations in neutral and figure 4 6 x10 sec bouts  Prone: Hip PA mobilizations in neutral and figure 4 6 x10 sec bouts   Supine: mulligan belt mobilizations R hip lateral and inferior 5 x10 sec bouts Supine: mulligan belt mobilizations R hip lateral and inferior 5 x10 sec bouts Supine: mulligan belt mobilizations R hip lateral and inferior 5 x10 sec bouts --    Hkly: bridges 20x 5 sec  R/L SL bridge x10 ea Hkly: bridges 20x 5 sec  R/L SL bridge x10 ea Hkly: pelvic tilt 10x10 sec  Hkly: bilat hip abd RTB with TrA 2x10  Hkly: pelvic tilt 10x10 sec       s/l: RTB clamshells and hip abd x15 ea   S/l: GTB clamshells and hip abd x15 ea  s/l: RTB clamshells and hip abd x15 ea STM with muscle stick R glut med/piriformis x5 min STM with muscle stick R glut med/piriformis x5 min          STM right piriformis x 5 min   Re-assess    HEP: 8/21/2023 continue with HEP  8/23/2023  HEP exercise additions will be in bold in flow sheet of day prescribed and continue to be in bold unless unbolded from flow sheet    Charges: Ex 3    Total Timed Treatment: 45 min  Total Treatment Time: 45 min

## 2023-11-06 ENCOUNTER — TELEPHONE (OUTPATIENT)
Dept: INTERNAL MEDICINE CLINIC | Facility: CLINIC | Age: 63
End: 2023-11-06

## 2023-11-06 NOTE — TELEPHONE ENCOUNTER
pt scheduled an apt duet to needing imaging at the end of this month. Does she need an apt to receive an order? Please advise.       Future Appointments   Date Time Provider Iftikhar Boyle   11/10/2023  9:45 AM Ina Schlatter, PT SNPT [facility]   11/29/2023  8:00 AM Kajal Parks MD EMG 8 [facility] [place]   12/5/2023 10:45 AM Ina Schlatter, PT SNPT [facility]   12/11/2023  8:45 AM Ina Schlatter, PT SNPT [facility]   12/18/2023  8:45 AM Ina Schlatter, PT SNPADOLFO [facility]   1/10/2024  9:45 AM Ina Schlatter, PT SABRINA [facility]

## 2023-11-10 ENCOUNTER — APPOINTMENT (OUTPATIENT)
Dept: PHYSICAL THERAPY | Age: 63
End: 2023-11-10
Attending: INTERNAL MEDICINE
Payer: MEDICARE

## 2023-11-10 RX ORDER — BUPROPION HYDROCHLORIDE 300 MG/1
300 TABLET ORAL EVERY MORNING
Qty: 90 TABLET | Refills: 3 | Status: SHIPPED | OUTPATIENT
Start: 2023-11-10

## 2023-11-10 NOTE — TELEPHONE ENCOUNTER
Changed pharmacy    Bupropion  mg  Filled 7-24-23  Qty 90  3 refills  Future Appointments   Date Time Provider Iftikhar Tamar   11/29/2023  8:00 AM Flora Hartmann MD EMG 8 [facility] [place]   12/5/2023 10:45 AM Gabi Cope, PT SNPT [facility]   12/11/2023  8:45 AM Gabi Cope, PT SNPT [facility]   12/18/2023  8:45 AM Gabi Cope, PT SNPT [facility]   1/10/2024  9:45 AM Marlo ELISE, PT SNPT [facility] 7-24-23 [facility]

## 2023-12-05 ENCOUNTER — APPOINTMENT (OUTPATIENT)
Dept: PHYSICAL THERAPY | Age: 63
End: 2023-12-05
Attending: INTERNAL MEDICINE
Payer: MEDICARE

## 2023-12-11 ENCOUNTER — OFFICE VISIT (OUTPATIENT)
Dept: PHYSICAL THERAPY | Age: 63
End: 2023-12-11
Attending: INTERNAL MEDICINE
Payer: MEDICARE

## 2023-12-11 PROCEDURE — 97110 THERAPEUTIC EXERCISES: CPT

## 2023-12-11 NOTE — PROGRESS NOTES
Diagnosis:   Strain of piriformis muscle, unspecified laterality, initial encounter (S54.993C)        Referring Provider: Cutr Sullivan  Date of Evaluation:    8/9/2023    Precautions:  None Next MD visit:   none scheduled  Date of Surgery: n/a   Insurance Primary/Secondary: [facility] / N/A     # Auth Visits: 10 approved 11/9            Subjective: states that she has been sick with the flu so not able to attend PT or work on HEP or exercising last 2 weeks. Was able to get back to walking 3 miles on level ground without increased hip pain. Has not tried incline or jogging in the past month. Feels that bending over activities is what brings on her hip pain. Has not done any exercise in 2 weeks. Pain: 1/10 right hip/buttock      Objective:   LEFS Score  LEFS Score: 72.5 % (8/7/2023  1:00 PM)  LEFS score: 67.5% 10/23/2023    Lumbar AROM: (* denotes performed with pain)  Flexion: 80 deg*  Extension: 35 deg  Sidebending: R 35 deg; L 25 deg  Rotation: R 75%; L 75%     Accessory motion: L1-5 mild hypomobility  Palpation: moderate tenderness right piriformis     Strength: (* denotes performed with pain)  LE   Hip flexion (L2): R 5/5; L 5/5  Hip abduction: R 4+/5; L 4+/5            Hip ER: R 4/5; L 4/5  Hip IR: R 5/5; L 5/5       Assessment: Completed todays treatment without c/o increased pain or discomfort following treatment. Able to walk 3 miles on level ground without hip pain. Has not tried to jog or walk on incline over the past partly due to being sick with a virus. Educated pt on progression of walking to jogging and only changing 1 variable at a time (distance, speed, incline, time) and to always exercise to tolerance and not push through pain. Pt states understanding. Hip mobility WNL. Denies pain post treatment. Goals:   (to be met in 10 visits)   Pt will improve transversus abdominis recruitment to perform proper isometric contraction without requiring verbal or tactile cuing to promote advancement of therex.  Met Pt will demonstrate good understanding of proper posture and body mechanics to decrease pain and improve spinal safety. Met    Pt will improve lumbar spine AROM flexion to >80 deg pain free to allow increase ease with bending forward to don shoes. In progress   Pt will report improved symptom centralization and absence of radicular symptoms into hip for 3 consecutive days to improve function with ADL. In progress   Pt will have decreased paraspinal mm tension to tolerate standing >60 minutes for work and home activities. Met    Pt will demonstrate improved core strength to be able to perform running with <1/10 pain. In progress   Pt will be independent and compliant with comprehensive HEP to maintain progress achieved in PT. In progress     Plan: continue POC> Progress as tolerated.   Date: 10/19/2023  Tx# 10/10 DAte: 10/23/2023  Tx# 11/18 Date: 10/31/2023  Tx# 12/18 Date: 12/11/2023  Tx# 13/18   Elliptical L1 x5 min Elliptical L1 x5 min Elliptical L1 x5 min Elliptical L1 x5 min   GTB lateral and monster band walking 35ft x 4 ea GTB lateral and monster band walking 35ft x 4 ea GTB lateral and monster band walking 35ft x 4 ea GTB lateral and monster band walking 35ft x 4 ea    Refomer 6 bands TrA with double knee ext  feet straight and feet in V position 3x20 reps ea Refomer 6 bands TrA with double knee ext  feet straight and feet in V position 3x20 reps ea Refomer 6 bands TrA with double knee ext  feet straight and feet in V position 3x20 reps ea    Reformer R/L SL 4 bands 3x10 reps ea Reformer R/L SL 4 bands 3x10 reps ea Reformer R/L SL 4 bands 3x10 reps ea    TRX squats 2x10 TRX squats 2x10 TRX squats 2x10    TRX L/R lateral lunges x12 ea  TRX L/R reverse lunges x12 ea TRX L/R lateral lunges x12 ea  TRX L/R reverse lunges x12 ea TRX L/R lateral lunges x12 ea  TRX L/R reverse lunges x12 ea    ASU with SLB at top R/L LE 8\" step x15 ea ASU with SLB at top R/L LE 8\" step x15 ea ASU with SLB at top R/L LE 8\" step x15 ea           Mini squats on BOSU flat side x10 Mini squats on BOSU flat side x10   --      STM/trigger point release R piriformis/glut med x8 min      Prone press ups with breathe out  and sag x10 Prone press ups with breathe out  and sag x10 Prone press ups with breathe out  and sag x10 Prone press ups with breathe out  and sag x10   Prone quad stretch 3x20 sec  Prone: R hip IR/ER PROM 2x10 ea  Prone quad stretch 3x20 sec  Prone: R hip IR/ER PROM 2x10 ea Prone quad stretch 3x20 sec  Prone: R hip IR/ER PROM 2x10 ea   Prone: Hip PA mobilizations in neutral and figure 4 6 x10 sec bouts  Prone: Hip PA mobilizations in neutral and figure 4 6 x10 sec bouts Prone: Hip PA mobilizations in neutral and figure 4 6 x10 sec bouts   Supine: mulligan belt mobilizations R hip lateral and inferior 5 x10 sec bouts --     Hkly: pelvic tilt 10x10 sec  Hkly: bilat hip abd RTB with TrA 2x10  Hkly: pelvic tilt 10x10 sec Hkly: pelvic tilt 10x10 sec     s/l: RTB clamshells and hip abd x15 ea s/l: RTB clamshells and hip abd x15 ea   s/l: RTB clamshells and hip abd x15 ea STM with muscle stick R glut med/piriformis x5 min STM with muscle stick R glut med/piriformis x5 min STM with muscle stick R glut med/piriformis x5 min          Re-assess     HEP: 8/21/2023 continue with HEP  8/23/2023  HEP exercise additions will be in bold in flow sheet of day prescribed and continue to be in bold unless unbolded from flow sheet    Charges: Ex 4    Total Timed Treatment: 55 min  Total Treatment Time: 55 min

## 2023-12-20 ENCOUNTER — OFFICE VISIT (OUTPATIENT)
Dept: INTERNAL MEDICINE CLINIC | Facility: CLINIC | Age: 63
End: 2023-12-20
Payer: MEDICARE

## 2023-12-20 ENCOUNTER — OFFICE VISIT (OUTPATIENT)
Dept: PHYSICAL THERAPY | Age: 63
End: 2023-12-20
Attending: INTERNAL MEDICINE
Payer: MEDICARE

## 2023-12-20 VITALS
HEART RATE: 82 BPM | BODY MASS INDEX: 18.03 KG/M2 | WEIGHT: 112.19 LBS | DIASTOLIC BLOOD PRESSURE: 68 MMHG | TEMPERATURE: 98 F | OXYGEN SATURATION: 99 % | HEIGHT: 66 IN | RESPIRATION RATE: 16 BRPM | SYSTOLIC BLOOD PRESSURE: 110 MMHG

## 2023-12-20 DIAGNOSIS — M79.18 PIRIFORMIS MUSCLE PAIN: ICD-10-CM

## 2023-12-20 DIAGNOSIS — M81.0 AGE-RELATED OSTEOPOROSIS WITHOUT CURRENT PATHOLOGICAL FRACTURE: Primary | ICD-10-CM

## 2023-12-20 PROCEDURE — 99214 OFFICE O/P EST MOD 30 MIN: CPT | Performed by: INTERNAL MEDICINE

## 2023-12-20 PROCEDURE — 3008F BODY MASS INDEX DOCD: CPT | Performed by: INTERNAL MEDICINE

## 2023-12-20 PROCEDURE — 3074F SYST BP LT 130 MM HG: CPT | Performed by: INTERNAL MEDICINE

## 2023-12-20 PROCEDURE — 3078F DIAST BP <80 MM HG: CPT | Performed by: INTERNAL MEDICINE

## 2023-12-20 PROCEDURE — 97110 THERAPEUTIC EXERCISES: CPT

## 2023-12-20 NOTE — PROGRESS NOTES
Diagnosis:   Strain of piriformis muscle, unspecified laterality, initial encounter (H03.618D)        Referring Provider: Arpan Whitney  Date of Evaluation:    8/9/2023    Precautions:  None Next MD visit:   none scheduled  Date of Surgery: n/a   Insurance Primary/Secondary: [facility] / N/A     # Auth Visits: 10 approved 11/9            Subjective: she is feeling a lot better and more positive about her pain since last session. States that she tried jogging again at 10 min 45 sec/mile speed for . 64 miles of jogging and didn't have her hip pain. She also walked 1.4 miles prior to the jog pain free. The pain has been intermittent since last session and not constant. Still feels that bending over aggravates the hip pain. Has been complaint with her HEP since last visit. Overall she is feeling 90% improvement in her pain and function. Pain: 0/10 right hip/buttock      Objective:   LEFS Score  LEFS Score: 72.5 % (8/7/2023  1:00 PM)  LEFS score: 67.5% 10/23/2023    Lumbar AROM: (* denotes performed with pain)  Flexion: 80 deg*  Extension: 35 deg  Sidebending: R 35 deg; L 25 deg  Rotation: R 75%; L 75%     Accessory motion: L1-5 mild hypomobility  Palpation: moderate tenderness right piriformis     Strength: (* denotes performed with pain)  LE   Hip flexion (L2): R 5/5; L 5/5  Hip abduction: R 4+/5; L 4+/5            Hip ER: R 4/5; L 4/5  Hip IR: R 5/5; L 5/5       Assessment: Completed todays treatment without c/o increased pain or discomfort following treatment. Did follow advice from last session and able to jog/walk without pain--slower pace and decreased distance. She still feels bending over is an aggravating factor. She responded well to all stabilization and strengthening exercises in clinic today. We reviewed return back to [employer] and I advised pt to start slow, modify exercises and no inclines. Pt states understanding. She is making progress towards her goal of returning to exercise.       Goals: (to be met in 10 visits)   Pt will improve transversus abdominis recruitment to perform proper isometric contraction without requiring verbal or tactile cuing to promote advancement of therex. Met    Pt will demonstrate good understanding of proper posture and body mechanics to decrease pain and improve spinal safety. Met    Pt will improve lumbar spine AROM flexion to >80 deg pain free to allow increase ease with bending forward to don shoes. In progress   Pt will report improved symptom centralization and absence of radicular symptoms into hip for 3 consecutive days to improve function with ADL. In progress   Pt will have decreased paraspinal mm tension to tolerate standing >60 minutes for work and home activities. Met    Pt will demonstrate improved core strength to be able to perform running with <1/10 pain. In progress   Pt will be independent and compliant with comprehensive HEP to maintain progress achieved in PT. In progress     Plan: continue POC> Progress as tolerated.   Date: 10/19/2023  Tx# 10/10 DAte: 10/23/2023  Tx# 11/18 Date: 10/31/2023  Tx# 12/18 Date: 12/11/2023  Tx# 13/18 Date: 12/20/2023  Tx# 14/18   Elliptical L1 x5 min Elliptical L1 x5 min Elliptical L1 x5 min Elliptical L1 x5 min Elliptical L1 x5 min   GTB lateral and monster band walking 35ft x 4 ea GTB lateral and monster band walking 35ft x 4 ea GTB lateral and monster band walking 35ft x 4 ea GTB lateral and monster band walking 35ft x 4 ea GTB lateral and monster band walking 35ft x 4 ea    Refomer 6 bands TrA with double knee ext  feet straight and feet in V position 3x20 reps ea Refomer 6 bands TrA with double knee ext  feet straight and feet in V position 3x20 reps ea Refomer 6 bands TrA with double knee ext  feet straight and feet in V position 3x20 reps ea Refomer 6 bands TrA with double knee ext  feet straight and feet in V position 3x20 reps ea    Reformer R/L SL 4 bands 3x10 reps ea Reformer R/L SL 4 bands 3x10 reps ea Reformer R/L SL 4 bands 3x10 reps ea Reformer R/L SL 5 bands 3x10 reps ea    TRX squats 2x10 TRX squats 2x10 TRX squats 2x10 TRX squats 2x10    TRX L/R lateral lunges x12 ea  TRX L/R reverse lunges x12 ea TRX L/R lateral lunges x12 ea  TRX L/R reverse lunges x12 ea TRX L/R lateral lunges x12 ea  TRX L/R reverse lunges x12 ea TRX L/R lateral lunges x12 ea  TRX L/R reverse lunges x12 ea    ASU with SLB at top R/L LE 8\" step x15 ea ASU with SLB at top R/L LE 8\" step x15 ea ASU with SLB at top R/L LE 8\" step x15 ea ASU with SLB at top R/L LE 8\" step x15 ea            Mini squats on BOSU flat side x10 Mini squats on BOSU flat side x10 Mini squats on BOSU flat side x15   --       STM/trigger point release R piriformis/glut med x8 min       Prone press ups with breathe out  and sag x10 Prone press ups with breathe out  and sag x10 Prone press ups with breathe out  and sag x10 Prone press ups with breathe out  and sag x10 Prone press ups with breathe out  and sag x10   Prone quad stretch 3x20 sec  Prone: R hip IR/ER PROM 2x10 ea  Prone quad stretch 3x20 sec  Prone: R hip IR/ER PROM 2x10 ea Prone quad stretch 3x20 sec  Prone: R hip IR/ER PROM 2x10 ea Prone quad stretch 3x20 sec  Prone: R hip IR/ER PROM 2x10 ea   Prone: Hip PA mobilizations in neutral and figure 4 6 x10 sec bouts  Prone: Hip PA mobilizations in neutral and figure 4 6 x10 sec bouts Prone: Hip PA mobilizations in neutral and figure 4 6 x10 sec bouts Prone: Hip PA mobilizations in neutral and figure 4 6 x10 sec bouts   Supine: mulligan belt mobilizations R hip lateral and inferior 5 x10 sec bouts --      Hkly: pelvic tilt 10x10 sec  Hkly: bilat hip abd RTB with TrA 2x10  Hkly: pelvic tilt 10x10 sec Hkly: pelvic tilt 10x10 sec      s/l: RTB clamshells and hip abd x15 ea s/l: RTB clamshells and hip abd x15 ea s/l: RTB clamshells and hip abd x15 ea   s/l: RTB clamshells and hip abd x15 ea STM with muscle stick R glut med/piriformis x5 min STM with muscle stick R glut med/piriformis x5 min STM with muscle stick R glut med/piriformis x5 min STM with muscle stick R glut med/piriformis x5 min           Re-assess      HEP: 8/21/2023 continue with HEP  8/23/2023  HEP exercise additions will be in bold in flow sheet of day prescribed and continue to be in bold unless unbolded from flow sheet    Charges: Ex 3    Total Timed Treatment: 45 min  Total Treatment Time: 45 min

## 2024-01-10 ENCOUNTER — OFFICE VISIT (OUTPATIENT)
Dept: PHYSICAL THERAPY | Age: 64
End: 2024-01-10
Attending: INTERNAL MEDICINE
Payer: MEDICARE

## 2024-01-10 ENCOUNTER — TELEPHONE (OUTPATIENT)
Dept: ORTHOPEDICS CLINIC | Facility: CLINIC | Age: 64
End: 2024-01-10

## 2024-01-10 DIAGNOSIS — R93.89 ABNORMAL MRI: Primary | ICD-10-CM

## 2024-01-10 DIAGNOSIS — M25.552 BILATERAL HIP PAIN: Primary | ICD-10-CM

## 2024-01-10 DIAGNOSIS — M25.551 BILATERAL HIP PAIN: Primary | ICD-10-CM

## 2024-01-10 PROCEDURE — 97110 THERAPEUTIC EXERCISES: CPT

## 2024-01-10 NOTE — TELEPHONE ENCOUNTER
Xray ordered. Please schedule for pt. Thanks!  There is an MRI completed but is not a good tool for evaluating joint spacing. Will still need the weight bearing xrays.

## 2024-01-10 NOTE — PROGRESS NOTES
Diagnosis:   Strain of piriformis muscle, unspecified laterality, initial encounter (S76.319A)        Referring Provider: Luz  Date of Evaluation:    8/9/2023    Precautions:  None Next MD visit:   none scheduled  Date of Surgery: n/a   Insurance Primary/Secondary: [employer] [facility] / N/A     # Auth Visits: 10 approved 11/9            Subjective: states that she has been doing pretty good since last session. Still feels her pain on a daily basis but able to do more and less intense. Had an MRI of her hip this morning so waiting on results. States she started back at [employer] last week. Went 2x last week and didnt have any increased pain. Modified workout to jogging .6 miles @ 5.4mph, and walked the rest of the time at 4mph for a total of 25 min on treadmill. Did the floor exercises and modified exercises to no pain. This week she went once and increased jogging distance to .7 miles without increased pain. Planning to go 3x/week to [employer].     Pain: 0/10 right hip/buttock      Objective:   LEFS Score  LEFS Score: 72.5 % (8/7/2023  1:00 PM)  LEFS score: 67.5% 10/23/2023    Lumbar AROM: (* denotes performed with pain)  Flexion: 80 deg*  Extension: 35 deg  Sidebending: R 35 deg; L 25 deg  Rotation: R 75%; L 75%     Accessory motion: L1-5 mild hypomobility  Palpation: moderate tenderness right piriformis     Strength: (* denotes performed with pain)  LE   Hip flexion (L2): R 5/5; L 5/5  Hip abduction: R 4+/5; L 4+/5            Hip ER: R 4/5; L 4/5  Hip IR: R 5/5; L 5/5       Assessment: Completed todays treatment without c/o increased pain or discomfort following treatment. Pt doing well since last session. Returned back to [employer] and modifying her workouts for be pain free. Is able to jog up to .7 miles without hip/buttock pain. She tolerated glut med strengthening progressions well today. Advised her to continue with increasing distance before speed and she can increase speed once  she reaches the distance she wants to jog/run. Pt states understanding. Denies pain post treatment.    Goals:   (to be met in 10 visits)   Pt will improve transversus abdominis recruitment to perform proper isometric contraction without requiring verbal or tactile cuing to promote advancement of therex. Met    Pt will demonstrate good understanding of proper posture and body mechanics to decrease pain and improve spinal safety. Met    Pt will improve lumbar spine AROM flexion to >80 deg pain free to allow increase ease with bending forward to don shoes. In progress   Pt will report improved symptom centralization and absence of radicular symptoms into hip for 3 consecutive days to improve function with ADL. In progress   Pt will have decreased paraspinal mm tension to tolerate standing >60 minutes for work and home activities. Met    Pt will demonstrate improved core strength to be able to perform running with <1/10 pain. In progress   Pt will be independent and compliant with comprehensive HEP to maintain progress achieved in PT. In progress     Plan: continue with HEP and ELVA. Follow up in 3-4 weeks.   Date: 10/31/2023  Tx# 12/18 Date: 12/11/2023  Tx# 13/18 Date: 12/20/2023  Tx# 14/18 Date: 1/10/2024  Tx# 15/18   Elliptical L1 x5 min Elliptical L1 x5 min Elliptical L1 x5 min Elliptical L1 x5 min   GTB lateral and monster band walking 35ft x 4 ea GTB lateral and monster band walking 35ft x 4 ea GTB lateral and monster band walking 35ft x 4 ea GTB lateral and monster band walking 35ft x 4 ea   Refomer 6 bands TrA with double knee ext  feet straight and feet in V position 3x20 reps ea Refomer 6 bands TrA with double knee ext  feet straight and feet in V position 3x20 reps ea Refomer 6 bands TrA with double knee ext  feet straight and feet in V position 3x20 reps ea Refomer 6 bands TrA with double knee ext  feet straight and feet in V position 3x20 reps ea   Reformer R/L SL 4 bands 3x10 reps ea Reformer R/L SL 4 bands  3x10 reps ea Reformer R/L SL 5 bands 3x10 reps ea Reformer R/L SL 5 bands x10, x8,  x5 reps ea   TRX squats 2x10 TRX squats 2x10 TRX squats 2x10 TRX squats 2x10   TRX L/R lateral lunges x12 ea  TRX L/R reverse lunges x12 ea TRX L/R lateral lunges x12 ea  TRX L/R reverse lunges x12 ea TRX L/R lateral lunges x12 ea  TRX L/R reverse lunges x12 ea TRX L/R lateral lunges x10 ea  TRX L/R reverse lunges x10 ea   ASU with SLB at top R/L LE 8\" step x15 ea ASU with SLB at top R/L LE 8\" step x15 ea ASU with SLB at top R/L LE 8\" step x15 ea ASU with SLB at top R/L LE 8\" step x15 ea      R/L LE lateral step down tap 6\" step x10 ea   Mini squats on BOSU flat side x10 Mini squats on BOSU flat side x10 Mini squats on BOSU flat side x15 Mini squats on BOSU flat side x20      Glut med squats at wall x10 R, x10 L         Prone press ups with breathe out  and sag x10 Prone press ups with breathe out  and sag x10 Prone press ups with breathe out  and sag x10 Prone press ups with breathe out  and sag x10   Prone quad stretch 3x20 sec  Prone: R hip IR/ER PROM 2x10 ea Prone quad stretch 3x20 sec  Prone: R hip IR/ER PROM 2x10 ea Prone quad stretch 3x20 sec  Prone: R hip IR/ER PROM 2x10 ea Prone quad stretch 3x20 sec  Prone: R hip IR/ER PROM 2x10 ea   Prone: Hip PA mobilizations in neutral and figure 4 6 x10 sec bouts Prone: Hip PA mobilizations in neutral and figure 4 6 x10 sec bouts Prone: Hip PA mobilizations in neutral and figure 4 6 x10 sec bouts Prone: Hip PA mobilizations in neutral and figure 4 6 x10 sec bouts         Hkly: pelvic tilt 10x10 sec Hkly: pelvic tilt 10x10 sec     s/l: RTB clamshells and hip abd x15 ea s/l: RTB clamshells and hip abd x15 ea s/l: RTB clamshells and hip abd x15 ea s/l: RTB clamshells and hip abd x15 ea   STM with muscle stick R glut med/piriformis x5 min STM with muscle stick R glut med/piriformis x5 min STM with muscle stick R glut med/piriformis x5 min STM with muscle stick R glut med/piriformis x5 min                HEP: 8/21/2023 continue with HEP  8/23/2023  HEP exercise additions will be in bold in flow sheet of day prescribed and continue to be in bold unless unbolded from flow sheet    Charges: Ex 3    Total Timed Treatment: 45 min  Total Treatment Time: 45 min

## 2024-01-10 NOTE — TELEPHONE ENCOUNTER
Future Appointments   Date Time Provider Department Center   1/15/2024  2:40 PM Ata Blandon PA-C [facility]       This patient is coming for ANDRÉS Knee osteoarthritis. No prior imaging done yet. Please advise if views are needed for this appt. Thanks.      Patient may be reached at [phone]

## 2024-01-31 ENCOUNTER — HOSPITAL ENCOUNTER (OUTPATIENT)
Dept: GENERAL RADIOLOGY | Age: 64
Discharge: HOME | End: 2024-01-31
Attending: PHYSICIAN ASSISTANT
Payer: MEDICARE

## 2024-01-31 ENCOUNTER — OFFICE VISIT (OUTPATIENT)
Dept: ORTHOPEDICS CLINIC | Facility: CLINIC | Age: 64
End: 2024-01-31
Payer: MEDICARE

## 2024-01-31 VITALS — WEIGHT: 112.19 LBS | HEIGHT: 66 IN | BODY MASS INDEX: 18.03 KG/M2

## 2024-01-31 DIAGNOSIS — M76.01 GLUTEAL TENDINITIS OF RIGHT BUTTOCK: Primary | ICD-10-CM

## 2024-01-31 DIAGNOSIS — M25.552 BILATERAL HIP PAIN: ICD-10-CM

## 2024-01-31 DIAGNOSIS — M25.551 BILATERAL HIP PAIN: ICD-10-CM

## 2024-01-31 PROCEDURE — 3008F BODY MASS INDEX DOCD: CPT | Performed by: PHYSICIAN ASSISTANT

## 2024-01-31 PROCEDURE — 73523 X-RAY EXAM HIPS BI 5/> VIEWS: CPT | Performed by: PHYSICIAN ASSISTANT

## 2024-01-31 PROCEDURE — 99203 OFFICE O/P NEW LOW 30 MIN: CPT | Performed by: PHYSICIAN ASSISTANT

## 2024-01-31 RX ORDER — MELOXICAM 15 MG/1
15 TABLET ORAL
Qty: 30 TABLET | Refills: 0 | Status: SHIPPED | OUTPATIENT
Start: 2024-01-31

## 2024-01-31 NOTE — H&P
[facility] New Patient Note    CC:   Chief Complaint   Patient presents with    Hip Pain     Right hip pain;   ONSET: 04/2023  PT since June;   Pain Score: 2       HPI: This 63 year old female presents today with complaints of right hip pain.  Patient reports that in April she started experiencing right buttock pain after using a treadmill following dead lifting.  She reports occasional pain that would radiate to the lateral hip and made worse with walking on inclines.  Currently pain is rated 2/10 intensity but initially could increase to 6/10 intensity.  She had done formal physical therapy documented from 8/9/2023 to 1/10/2024 with some improvement.  No prior injections or surgeries for the right hip.  Denies any clicking or popping felt in the right hip.  She very rarely will take Advil for pain relief.    History reviewed. No pertinent past medical history.  History reviewed. No pertinent surgical history.  Current Outpatient Medications   Medication Sig Dispense Refill    Meloxicam 15 MG Oral Tab Take 1 tablet (15 mg total) by mouth daily with food. Take for 2 weeks regularly, then as-needed afterwards. 30 tablet 0    buPROPion  MG Oral Tablet 24 Hr Take 1 tablet (300 mg total) by mouth every morning. 90 tablet 3    Calcium Carbonate 1500 (600 Ca) MG Oral Tab Take 600 mg by mouth daily with breakfast.      Cholecalciferol (VITAMIN D) 50 MCG (2000 UT) Oral Tab Take 1 tablet by mouth daily.      Multiple Vitamins-Minerals (MULTI VITAMIN/MINERALS) Oral Tab Take by mouth.      metroNIDAZOLE 0.75 % External Gel Apply topically as needed.       No Known Allergies  Family History   Problem Relation Age of Onset    Musculo-skelatal Disorder Mother     Depression Mother     Depression Father     Cancer Father         melanoma    Depression Sister     Depression Sister      Social History     Occupational History    Not on file   Tobacco Use    Smoking status: Never    Smokeless tobacco: Never   Vaping Use     Vaping Use: Never used   Substance and Sexual Activity    Alcohol use: Yes     Comment: occ    Drug use: Never    Sexual activity: Not on file        ROS:  Comprehensive system review obtained and negative except as mentioned above      Physical Exam:    Ht 5' 6\" (1.676 m)   Wt 112 lb 3.2 oz (50.9 kg)   BMI 18.11 kg/m²   Constitutional: Awake, alert, no distress.  Very pleasant.  Psychological: Appropriate affect.  Respiratory: Unlabored breathing.  Right lower extremity:  Inspection: skin is intact without any redness, deformity, or effusion.   Palpation: No significant tenderness palpation along the greater trochanteric bursa, posterior to the greater trochanter along the gluteal tendon insertion,, or the IT band.  Range of motion: Internal rotation of hip to approximately 45 degrees. External rotation of hip to approximately 45 degrees.  No significant pain with rotation of the hip.  Stinchfield test negative.  Rosa's test negative for any IT band tightness.  Adolfo position negative, FADIR position negative.   Neuromuscular: Strength is normal and sensation is intact.  Vascular: Extremities are warm and well-perfused.  Lymph: Unremarkable.      Imaging: Imaging was personally viewed, independently interpreted and radiology report read.  Radiographs of the right hip recently obtained demonstrates well-maintained joint spaces without any significant degenerative changes appreciated.  MRI of the right hip obtained on 1/10/2024 demonstrates tendinosis of the right gluteus minimus tendon as well as fraying of the right acetabular labrum.      Assessment/Plan:  Diagnoses and all orders for this visit:    Gluteal tendinitis of right buttock  -     Meloxicam 15 MG Oral Tab; Take 1 tablet (15 mg total) by mouth daily with food. Take for 2 weeks regularly, then as-needed afterwards.  -     OP REFERRAL TO [facility]      Assessment: 63-year-old female with symptomatic tendinitis of the right  gluteus minimus    Plan: I thoroughly discussed nonsurgical approaches to the patient's pain.  We discussed conservative management with anti-inflammatory medication, physical therapy, as well as ultrasound-guided corticosteroid injection into the tendon sheath.  We also briefly discussed the role of PRP injections as well as the out-of-pocket cost.  After discussion of the options, the patient endorsed interest in a 2-week prescription of meloxicam as well as renewed referral for physical therapy.  Both of these were provided to the patient in clinic today.  If no significant improvement from these measures could consider ultrasound-guided corticosteroid injection in the future.  I will MyChart message the patient in 2 weeks to follow-up on her condition and discuss further treatment recommendations including possible ultrasound-guided injection if necessary.  Patient and her partner's questions were sought and answered satisfactorily.  They are happy with the plan and will follow as advised.      Ata Blandon PA-C  [facility]    This note was dictated using Dragon software.  While it was briefly proofread prior to completion, some grammatical, spelling, and word choice errors due to dictation may still occur.

## 2024-02-06 ENCOUNTER — OFFICE VISIT (OUTPATIENT)
Dept: PHYSICAL THERAPY | Age: 64
End: 2024-02-06
Attending: INTERNAL MEDICINE
Payer: MEDICARE

## 2024-02-06 PROCEDURE — 97110 THERAPEUTIC EXERCISES: CPT

## 2024-02-06 PROCEDURE — 97140 MANUAL THERAPY 1/> REGIONS: CPT

## 2024-02-06 NOTE — PROGRESS NOTES
Diagnosis:   Strain of piriformis muscle, unspecified laterality, initial encounter (S76.319A)        Referring Provider: Setlur  Date of Evaluation:    8/9/2023    Precautions:  None Next MD visit:   none scheduled  Date of Surgery: n/a   Insurance Primary/Secondary: [employer] [facility] / N/A     # Auth Visits: 10 approved 11/9            Subjective: going to [employer] 3 days/week and able to complete workouts with minimal to no increases in pain. Is modifying her work outs to walking only at 4mph 0% incline and no rower. Modifying floor exercises to no pain. Walking 3 miles a few times/week.  Got MRI results which showed glut med strain. Saw orthopedic last week and was prescribed meloxicam.     Pain: 0/10 right hip/buttock      Objective:   LEFS Score  LEFS Score: 72.5 % (8/7/2023  1:00 PM)  LEFS score: 67.5% 10/23/2023    Lumbar AROM: (* denotes performed with pain)  Flexion: 80 deg*  Extension: 35 deg  Sidebending: R 35 deg; L 25 deg  Rotation: R 75%; L 75%     Accessory motion: L1-5 mild hypomobility  Palpation: moderate tenderness right piriformis     Strength: (* denotes performed with pain)  LE   Hip flexion (L2): R 5/5; L 5/5  Hip abduction: R 4+/5; L 4+/5            Hip ER: R 4/5; L 4/5  Hip IR: R 5/5; L 5/5       Assessment: Completed todays treatment without c/o increased pain or discomfort following treatment. MRI results showed glut min tendinosis and partial interstitial tear and semimembranosus tendonitis. Trialed cupping today for glut min with good tolerance. Added hamstring sets to HEP to help facilitate blood flow for tendonitis. Advised pt to continue to slowly progress her work outs at the gym.  Denies pain post treatment.     Goals:   (to be met in 10 visits)   Pt will improve transversus abdominis recruitment to perform proper isometric contraction without requiring verbal or tactile cuing to promote advancement of therex. Met    Pt will demonstrate good understanding of proper posture and body  mechanics to decrease pain and improve spinal safety. Met    Pt will improve lumbar spine AROM flexion to >80 deg pain free to allow increase ease with bending forward to don shoes. In progress   Pt will report improved symptom centralization and absence of radicular symptoms into hip for 3 consecutive days to improve function with ADL. In progress   Pt will have decreased paraspinal mm tension to tolerate standing >60 minutes for work and home activities. Met    Pt will demonstrate improved core strength to be able to perform running with <1/10 pain. In progress   Pt will be independent and compliant with comprehensive HEP to maintain progress achieved in PT. In progress     Plan: continue with HEP and ELVA. Follow up in 3-4 weeks.   Date: 10/31/2023  Tx# 12/18 Date: 12/11/2023  Tx# 13/18 Date: 12/20/2023  Tx# 14/18 Date: 1/10/2024  Tx# 15/18 Date: 2/6/2024  Tx# 16/18   Elliptical L1 x5 min Elliptical L1 x5 min Elliptical L1 x5 min Elliptical L1 x5 min Elliptical L1 x5 min   GTB lateral and monster band walking 35ft x 4 ea GTB lateral and monster band walking 35ft x 4 ea GTB lateral and monster band walking 35ft x 4 ea GTB lateral and monster band walking 35ft x 4 ea GTB lateral and monster band walking 35ft x 4 ea   Refomer 6 bands TrA with double knee ext  feet straight and feet in V position 3x20 reps ea Refomer 6 bands TrA with double knee ext  feet straight and feet in V position 3x20 reps ea Refomer 6 bands TrA with double knee ext  feet straight and feet in V position 3x20 reps ea Refomer 6 bands TrA with double knee ext  feet straight and feet in V position 3x20 reps ea Refomer 6 bands TrA with double knee ext  feet straight and feet in V position 3x20 reps ea   Reformer R/L SL 4 bands 3x10 reps ea Reformer R/L SL 4 bands 3x10 reps ea Reformer R/L SL 5 bands 3x10 reps ea Reformer R/L SL 5 bands x10, x8,  x5 reps ea Reformer R/L SL 5 bands x10, x8,  x5 reps ea   TRX squats 2x10 TRX squats 2x10 TRX squats 2x10  TRX squats 2x10 TRX squats 2x10   TRX L/R lateral lunges x12 ea  TRX L/R reverse lunges x12 ea TRX L/R lateral lunges x12 ea  TRX L/R reverse lunges x12 ea TRX L/R lateral lunges x12 ea  TRX L/R reverse lunges x12 ea TRX L/R lateral lunges x10 ea  TRX L/R reverse lunges x10 ea TRX L/R lateral lunges x10 ea  TRX L/R reverse lunges x10 ea   ASU with SLB at top R/L LE 8\" step x15 ea ASU with SLB at top R/L LE 8\" step x15 ea ASU with SLB at top R/L LE 8\" step x15 ea ASU with SLB at top R/L LE 8\" step x15 ea --      R/L LE lateral step down tap 6\" step x10 ea Figure 4 piriformis stretch 3x 20 sec   Mini squats on BOSU flat side x10 Mini squats on BOSU flat side x10 Mini squats on BOSU flat side x15 Mini squats on BOSU flat side x20       Glut med squats at wall x10 R, x10 L --          Prone press ups with breathe out  and sag x10 Prone press ups with breathe out  and sag x10 Prone press ups with breathe out  and sag x10 Prone press ups with breathe out  and sag x10 Prone press ups with breathe out  and sag x10   Prone quad stretch 3x20 sec  Prone: R hip IR/ER PROM 2x10 ea Prone quad stretch 3x20 sec  Prone: R hip IR/ER PROM 2x10 ea Prone quad stretch 3x20 sec  Prone: R hip IR/ER PROM 2x10 ea Prone quad stretch 3x20 sec  Prone: R hip IR/ER PROM 2x10 ea Prone quad stretch 3x20 sec  Prone: R hip IR/ER PROM 2x10 ea   Prone: Hip PA mobilizations in neutral and figure 4 6 x10 sec bouts Prone: Hip PA mobilizations in neutral and figure 4 6 x10 sec bouts Prone: Hip PA mobilizations in neutral and figure 4 6 x10 sec bouts Prone: Hip PA mobilizations in neutral and figure 4 6 x10 sec bouts Prone: Hip PA mobilizations in neutral and figure 4 6 x10 sec bouts          Hkly: pelvic tilt 10x10 sec Hkly: pelvic tilt 10x10 sec      s/l: RTB clamshells and hip abd x15 ea s/l: RTB clamshells and hip abd x15 ea s/l: RTB clamshells and hip abd x15 ea s/l: RTB clamshells and hip abd x15 ea s/l: RTB clamshells and hip abd x20 ea   STM with  muscle stick R glut med/piriformis x5 min STM with muscle stick R glut med/piriformis x5 min STM with muscle stick R glut med/piriformis x5 min STM with muscle stick R glut med/piriformis x5 min Cupping R glut med/min x5 min       Hamstring set 10x 3-5 sec          HEP: 8/21/2023 continue with HEP  8/23/2023  HEP exercise additions will be in bold in flow sheet of day prescribed and continue to be in bold unless unbolded from flow sheet  2/6/2024  Access Code: [email or web address]  URL: [email or web address]  Date: 02/06/2024  Prepared by: aKila Freeman    Exercises  - Supine Isometric Hamstring Set  - 2 x daily - 7 x weekly - 1 sets - 10 reps - 3-5 sec hold    Charges: Ex 2 (35) MT 1 (10)    Total Timed Treatment: 45 min  Total Treatment Time: 45 min

## 2024-03-05 ENCOUNTER — OFFICE VISIT (OUTPATIENT)
Dept: PHYSICAL THERAPY | Age: 64
End: 2024-03-05
Attending: INTERNAL MEDICINE
Payer: MEDICARE

## 2024-03-05 PROCEDURE — 97110 THERAPEUTIC EXERCISES: CPT

## 2024-03-05 PROCEDURE — 97140 MANUAL THERAPY 1/> REGIONS: CPT

## 2024-03-05 NOTE — PROGRESS NOTES
Diagnosis:   Strain of piriformis muscle, unspecified laterality, initial encounter (S76.319A)        Referring Provider: Cleor  Date of Evaluation:    8/9/2023    Precautions:  None Next MD visit:   none scheduled  Date of Surgery: n/a   Insurance Primary/Secondary: [employer]MANA [facility] / N/A     # Auth Visits: 10 approved 11/9            Subjective: going to [employer] 4 days/week and able to complete workouts with minimal to no increases in pain. Is modifying her work outs to walking at 4.2 mph and jogging at 5.7 mph 0% incline to 1 mile and no rower. Modifying floor exercises to no pain. Walking/jogging 3 miles 2 times/week.  Meloxicam really helped with the pain and finished the course last friday.     Pain: 0/10 right hip/buttock      Objective:   LEFS Score  LEFS Score: 72.5 % (8/7/2023  1:00 PM)  LEFS score: 67.5% 10/23/2023    Lumbar AROM: (* denotes performed with pain)  Flexion: 80 deg*  Extension: 35 deg  Sidebending: R 35 deg; L 25 deg  Rotation: R 75%; L 75%     Accessory motion: L1-5 mild hypomobility  Palpation: moderate tenderness right piriformis     Strength: (* denotes performed with pain)  LE   Hip flexion (L2): R 5/5; L 5/5  Hip abduction: R 4+/5; L 4+/5            Hip ER: R 4/5; L 4+/5  Hip IR: R 5/5; L 5/5       Assessment: Completed todays treatment without c/o increased pain or discomfort following treatment. Pt had slowly returned back to [employer] 4 days/ week with minimal to no increases in her pain. Still modifying her workouts and not back to prior level of exercise. Today we reviewed a lot of modifications for different floor exercises that she regularly does at [employer]. Educated on pacing her increases in activity. Taught her more core exercises. Hip mobility is improving and WNL. Will follow up in 1 month as she progresses back to prior level of activity. Hip strength deficits still present but improving.     Goals:   (to be met in 10 visits)   Pt will improve  transversus abdominis recruitment to perform proper isometric contraction without requiring verbal or tactile cuing to promote advancement of therex. Met    Pt will demonstrate good understanding of proper posture and body mechanics to decrease pain and improve spinal safety. Met    Pt will improve lumbar spine AROM flexion to >80 deg pain free to allow increase ease with bending forward to don shoes. In progress   Pt will report improved symptom centralization and absence of radicular symptoms into hip for 3 consecutive days to improve function with ADL. In progress   Pt will have decreased paraspinal mm tension to tolerate standing >60 minutes for work and home activities. Met    Pt will demonstrate improved core strength to be able to perform running with <1/10 pain. In progress   Pt will be independent and compliant with comprehensive HEP to maintain progress achieved in PT. In progress     Plan: continue with HEP and ELVA. Follow up in 3-4 weeks.   Date: 10/31/2023  Tx# 12/18 Date: 12/11/2023  Tx# 13/18 Date: 12/20/2023  Tx# 14/18 Date: 1/10/2024  Tx# 15/18 Date: 2/6/2024  Tx# 16/18 Date: 3/5/2024  Tx# 17/18   Elliptical L1 x5 min Elliptical L1 x5 min Elliptical L1 x5 min Elliptical L1 x5 min Elliptical L1 x5 min Reviewed modifications at [facility], discussed progressions with walking/jogging x20 min   GTB lateral and monster band walking 35ft x 4 ea GTB lateral and monster band walking 35ft x 4 ea GTB lateral and monster band walking 35ft x 4 ea GTB lateral and monster band walking 35ft x 4 ea GTB lateral and monster band walking 35ft x 4 ea QL stretch in sitting with OH reach and fowrard reach 3x10 sec   Refomer 6 bands TrA with double knee ext  feet straight and feet in V position 3x20 reps ea Refomer 6 bands TrA with double knee ext  feet straight and feet in V position 3x20 reps ea Refomer 6 bands TrA with double knee ext  feet straight and feet in V position 3x20 reps ea Refomer 6 bands TrA with double  knee ext  feet straight and feet in V position 3x20 reps ea Refomer 6 bands TrA with double knee ext  feet straight and feet in V position 3x20 reps ea Quadruped bird-dog x10 ea  Hkly: small range sit up (only shoulders up)   Hkly: oblique sit up (small range)  Half kneeling hip flexor stretch    Reformer R/L SL 4 bands 3x10 reps ea Reformer R/L SL 4 bands 3x10 reps ea Reformer R/L SL 5 bands 3x10 reps ea Reformer R/L SL 5 bands x10, x8,  x5 reps ea Reformer R/L SL 5 bands x10, x8,  x5 reps ea    TRX squats 2x10 TRX squats 2x10 TRX squats 2x10 TRX squats 2x10 TRX squats 2x10    TRX L/R lateral lunges x12 ea  TRX L/R reverse lunges x12 ea TRX L/R lateral lunges x12 ea  TRX L/R reverse lunges x12 ea TRX L/R lateral lunges x12 ea  TRX L/R reverse lunges x12 ea TRX L/R lateral lunges x10 ea  TRX L/R reverse lunges x10 ea TRX L/R lateral lunges x10 ea  TRX L/R reverse lunges x10 ea    ASU with SLB at top R/L LE 8\" step x15 ea ASU with SLB at top R/L LE 8\" step x15 ea ASU with SLB at top R/L LE 8\" step x15 ea ASU with SLB at top R/L LE 8\" step x15 ea --       R/L LE lateral step down tap 6\" step x10 ea Figure 4 piriformis stretch 3x 20 sec    Mini squats on BOSU flat side x10 Mini squats on BOSU flat side x10 Mini squats on BOSU flat side x15 Mini squats on BOSU flat side x20        Glut med squats at wall x10 R, x10 L --            Prone press ups with breathe out  and sag x10 Prone press ups with breathe out  and sag x10 Prone press ups with breathe out  and sag x10 Prone press ups with breathe out  and sag x10 Prone press ups with breathe out  and sag x10    Prone quad stretch 3x20 sec  Prone: R hip IR/ER PROM 2x10 ea Prone quad stretch 3x20 sec  Prone: R hip IR/ER PROM 2x10 ea Prone quad stretch 3x20 sec  Prone: R hip IR/ER PROM 2x10 ea Prone quad stretch 3x20 sec  Prone: R hip IR/ER PROM 2x10 ea Prone quad stretch 3x20 sec  Prone: R hip IR/ER PROM 2x10 ea Prone quad stretch 3x20 sec  Prone: R hip IR/ER PROM 2x10 ea    Prone: Hip PA mobilizations in neutral and figure 4 6 x10 sec bouts Prone: Hip PA mobilizations in neutral and figure 4 6 x10 sec bouts Prone: Hip PA mobilizations in neutral and figure 4 6 x10 sec bouts Prone: Hip PA mobilizations in neutral and figure 4 6 x10 sec bouts Prone: Hip PA mobilizations in neutral and figure 4 6 x10 sec bouts Prone: Hip PA mobilizations in neutral and figure 4 6 x10 sec bouts           Hkly: pelvic tilt 10x10 sec Hkly: pelvic tilt 10x10 sec       s/l: RTB clamshells and hip abd x15 ea s/l: RTB clamshells and hip abd x15 ea s/l: RTB clamshells and hip abd x15 ea s/l: RTB clamshells and hip abd x15 ea s/l: RTB clamshells and hip abd x20 ea    STM with muscle stick R glut med/piriformis x5 min STM with muscle stick R glut med/piriformis x5 min STM with muscle stick R glut med/piriformis x5 min STM with muscle stick R glut med/piriformis x5 min Cupping R glut med/min x5 min        Hamstring set 10x 3-5 sec            HEP: 8/21/2023 continue with HEP  8/23/2023  HEP exercise additions will be in bold in flow sheet of day prescribed and continue to be in bold unless unbolded from flow sheet  2/6/2024  Access Code: [email or web address]  URL: [email or web address]  Date: 02/06/2024  Prepared by: Kaila Freeman    Exercises  - Supine Isometric Hamstring Set  - 2 x daily - 7 x weekly - 1 sets - 10 reps - 3-5 sec hold    Charges: Ex 2 (35) MT 1 (10)    Total Timed Treatment: 45 min  Total Treatment Time: 45 min

## 2024-04-02 ENCOUNTER — OFFICE VISIT (OUTPATIENT)
Dept: PHYSICAL THERAPY | Age: 64
End: 2024-04-02
Attending: INTERNAL MEDICINE
Payer: MEDICARE

## 2024-04-02 PROCEDURE — 97140 MANUAL THERAPY 1/> REGIONS: CPT

## 2024-04-02 PROCEDURE — 97110 THERAPEUTIC EXERCISES: CPT

## 2024-04-02 NOTE — PROGRESS NOTES
Diagnosis:   Strain of piriformis muscle, unspecified laterality, initial encounter (S76.319A)        Referring Provider: Luz  Date of Evaluation:    8/9/2023    Precautions:  None Next MD visit:   none scheduled  Date of Surgery: n/a   Insurance Primary/Secondary: [employer] [facility] / N/A     # Auth Visits: 10 approved 11/9            Progress Summary  Pt has attended 18  visits in Physical Therapy. Feels 80% improvement in her pain and function. States her posterior hip pain has resolved but has been feeling the discomfort more in superior lateral aspect of the hip which started about 3 weeks. She has not been to [employer] in 2 weeks due to some renovations in her home but plans to get back there soon. She has been doing okay with modifications at [employer] when she was going. Her superoantero-lateral hip discomfort is over the iliac crest and seems consistent with a gluteal tendonitis. She has made good improvements with her strength, ROM and flexibility. She will benefit from continued skilled PT to acheive goals still in progress.    Objective:   LEFS Score  LEFS Score: 72.5 % (8/7/2023  1:00 PM)  LEFS score: 67.5% 10/23/2023     Lumbar AROM: (* denotes performed with pain)  Flexion: 80 deg*  Extension: 35 deg  Sidebending: R 35 deg; L 25 deg  Rotation: R 75%; L 75%     Accessory motion: L1-5 mild hypomobility  Palpation: moderate tenderness right piriformis     Strength: (* denotes performed with pain)  LE   Hip flexion (L2): R 5/5; L 5/5  Hip abduction: R 4+/5; L 4+/5            Hip ER: R 4/5; L 4/5  Hip IR: R 5/5; L 5/5       Goals:   (to be met in 10 visits)   Pt will improve transversus abdominis recruitment to perform proper isometric contraction without requiring verbal or tactile cuing to promote advancement of therex. Met    Pt will demonstrate good understanding of proper posture and body mechanics to decrease pain and improve spinal safety. Met    Pt will improve lumbar spine AROM flexion  to >80 deg pain free to allow increase ease with bending forward to don shoes. In progress   Pt will report improved symptom centralization and absence of radicular symptoms into hip for 3 consecutive days to improve function with ADL. In progress   Pt will have decreased paraspinal mm tension to tolerate standing >60 minutes for work and home activities. Met    Pt will demonstrate improved core strength to be able to perform running with <1/10 pain. In progress   Pt will be independent and compliant with comprehensive HEP to maintain progress achieved in PT. In progress     Rehab Potential: good    Plan: Continue skilled Physical Therapy 1 x/week or a total of 3 more visits over a 90 day period.        Patient/Family/Caregiver was advised of these findings, precautions, and treatment options and has agreed to actively participate in planning and for this course of care.    Thank you for your referral. If you have any questions, please contact me at Dept: [phone].    Sincerely,  Electronically signed by therapist: Kaila Freeman PT    Physician's certification required: Yes  Please co-sign or sign and return this letter via fax as soon as possible to [phone].   I certify the need for these services furnished under this plan of treatment and while under my care.    X___________________________________________________ Date____________________    Certification From: 4/2/2024  To:7/1/2024        Subjective: feels 80% better.     Pain: 0/10 right hip/buttock  2.5/10 lateral hip    Objective:   See PN    Assessment: focused on manual treatments today for glut med/min. Taught ITB/QL stretching    Goals: See PN    Plan: continue with HEP and ELVA. Follow up in 3-4 weeks with MD/insurance approval  Date: 12/20/2023  Tx# 14/18 Date: 1/10/2024  Tx# 15/18 Date: 2/6/2024  Tx# 16/18 Date: 3/5/2024  Tx# 17/18 Date: 4/2/2024  Tx# 18/18   Elliptical L1 x5 min Elliptical L1 x5 min Elliptical L1 x5 min Reviewed modifications  at [employer], discussed progressions with walking/jogging x20 min Reviewed workout routine at home and modifications. Discussed current symptoms.   Re-assess   GTB lateral and monster band walking 35ft x 4 ea GTB lateral and monster band walking 35ft x 4 ea GTB lateral and monster band walking 35ft x 4 ea QL stretch in sitting with OH reach and fowrard reach 3x10 sec ITB/QL stretch standing at wall 3x15 sec   Refomer 6 bands TrA with double knee ext  feet straight and feet in V position 3x20 reps ea Refomer 6 bands TrA with double knee ext  feet straight and feet in V position 3x20 reps ea Refomer 6 bands TrA with double knee ext  feet straight and feet in V position 3x20 reps ea Quadruped bird-dog x10 ea  Hkly: small range sit up (only shoulders up)   Hkly: oblique sit up (small range)  Half kneeling hip flexor stretch  Manual Tx: 20 min  STM right glut med/min, cupping, manual stretching     Reformer R/L SL 5 bands 3x10 reps ea Reformer R/L SL 5 bands x10, x8,  x5 reps ea Reformer R/L SL 5 bands x10, x8,  x5 reps ea     TRX squats 2x10 TRX squats 2x10 TRX squats 2x10     TRX L/R lateral lunges x12 ea  TRX L/R reverse lunges x12 ea TRX L/R lateral lunges x10 ea  TRX L/R reverse lunges x10 ea TRX L/R lateral lunges x10 ea  TRX L/R reverse lunges x10 ea     ASU with SLB at top R/L LE 8\" step x15 ea ASU with SLB at top R/L LE 8\" step x15 ea --      R/L LE lateral step down tap 6\" step x10 ea Figure 4 piriformis stretch 3x 20 sec     Mini squats on BOSU flat side x15 Mini squats on BOSU flat side x20       Glut med squats at wall x10 R, x10 L --            Prone press ups with breathe out  and sag x10 Prone press ups with breathe out  and sag x10 Prone press ups with breathe out  and sag x10     Prone quad stretch 3x20 sec  Prone: R hip IR/ER PROM 2x10 ea Prone quad stretch 3x20 sec  Prone: R hip IR/ER PROM 2x10 ea Prone quad stretch 3x20 sec  Prone: R hip IR/ER PROM 2x10 ea Prone quad stretch 3x20 sec  Prone: R  hip IR/ER PROM 2x10 ea    Prone: Hip PA mobilizations in neutral and figure 4 6 x10 sec bouts Prone: Hip PA mobilizations in neutral and figure 4 6 x10 sec bouts Prone: Hip PA mobilizations in neutral and figure 4 6 x10 sec bouts Prone: Hip PA mobilizations in neutral and figure 4 6 x10 sec bouts                  s/l: RTB clamshells and hip abd x15 ea s/l: RTB clamshells and hip abd x15 ea s/l: RTB clamshells and hip abd x20 ea     STM with muscle stick R glut med/piriformis x5 min STM with muscle stick R glut med/piriformis x5 min Cupping R glut med/min x5 min       Hamstring set 10x 3-5 sec            HEP: 8/21/2023 continue with HEP  8/23/2023  HEP exercise additions will be in bold in flow sheet of day prescribed and continue to be in bold unless unbolded from flow sheet  2/6/2024  Access Code: [email or web address]  URL: [email or web address]  Date: 02/06/2024  Prepared by: Kaila Freeman    Exercises  - Supine Isometric Hamstring Set  - 2 x daily - 7 x weekly - 1 sets - 10 reps - 3-5 sec hold  4/2/2024  Standing ITB Stretch  - 2-3 x daily - 7 x weekly - 1 sets - 3-5 reps - 10-15 sec hold    Charges: Ex 2 (25) MT 1 (20)    Total Timed Treatment: 45 min  Total Treatment Time: 45 min

## 2024-05-14 ENCOUNTER — APPOINTMENT (OUTPATIENT)
Dept: PHYSICAL THERAPY | Age: 64
End: 2024-05-14
Attending: INTERNAL MEDICINE
Payer: MEDICARE

## 2024-08-02 ENCOUNTER — OFFICE VISIT (OUTPATIENT)
Dept: INTERNAL MEDICINE CLINIC | Facility: CLINIC | Age: 64
End: 2024-08-02
Payer: MEDICARE

## 2024-08-02 VITALS
DIASTOLIC BLOOD PRESSURE: 70 MMHG | RESPIRATION RATE: 16 BRPM | OXYGEN SATURATION: 93 % | HEART RATE: 64 BPM | BODY MASS INDEX: 17.78 KG/M2 | HEIGHT: 66.25 IN | WEIGHT: 110.63 LBS | SYSTOLIC BLOOD PRESSURE: 110 MMHG | TEMPERATURE: 98 F

## 2024-08-02 DIAGNOSIS — H61.23 BILATERAL IMPACTED CERUMEN: ICD-10-CM

## 2024-08-02 DIAGNOSIS — M79.642 BILATERAL HAND PAIN: ICD-10-CM

## 2024-08-02 DIAGNOSIS — Z12.11 SCREENING FOR COLON CANCER: ICD-10-CM

## 2024-08-02 DIAGNOSIS — Z12.31 SCREENING MAMMOGRAM FOR BREAST CANCER: ICD-10-CM

## 2024-08-02 DIAGNOSIS — R53.83 FATIGUE, UNSPECIFIED TYPE: ICD-10-CM

## 2024-08-02 DIAGNOSIS — Z13.220 SCREENING FOR LIPOID DISORDERS: ICD-10-CM

## 2024-08-02 DIAGNOSIS — M81.0 AGE-RELATED OSTEOPOROSIS WITHOUT CURRENT PATHOLOGICAL FRACTURE: ICD-10-CM

## 2024-08-02 DIAGNOSIS — M79.641 BILATERAL HAND PAIN: ICD-10-CM

## 2024-08-02 DIAGNOSIS — Z00.00 WELLNESS EXAMINATION: Primary | ICD-10-CM

## 2024-08-02 DIAGNOSIS — F32.5 MAJOR DEPRESSIVE DISORDER WITH SINGLE EPISODE, IN FULL REMISSION (HCC): ICD-10-CM

## 2024-08-02 DIAGNOSIS — G43.009 MIGRAINE WITHOUT AURA AND WITHOUT STATUS MIGRAINOSUS, NOT INTRACTABLE: ICD-10-CM

## 2024-08-02 RX ORDER — SUMATRIPTAN 50 MG/1
TABLET, FILM COATED ORAL
Qty: 8 TABLET | Refills: 3 | Status: SHIPPED | OUTPATIENT
Start: 2024-08-02

## 2024-08-02 RX ORDER — SUMATRIPTAN 50 MG/1
50 TABLET, FILM COATED ORAL EVERY 2 HOUR PRN
Refills: 0 | Status: CANCELLED | OUTPATIENT
Start: 2024-08-02

## 2024-08-02 NOTE — PROGRESS NOTES
Subjective:   Kateryna Benítez is a 64 year old female who presents for a MA AHA (Medicare Advantage Annual Health Assessment) and Medicare Subsequent Annual Wellness visit (Pt already had Initial Annual Wellness) and scheduled follow up of multiple significant but stable problems.     Mammo due in 8/30/24  Colon ca screening due in 12/2024    DEXA showed osteoporosis of lumbar spine in 8/2023  She wanted to hold off on medication     Her gluteal tendinitis is better. It took a year of physical therapy and she saw orthopedics     Hand arthritis and saw occupational therapy       History/Other:   Fall Risk Assessment:   She has been screened for Falls and is low risk.      Cognitive Assessment:   She had a completely normal cognitive assessment - see flowsheet entries       Functional Ability/Status:   Kateryna Benítez has a completely normal functional assessment. See flowsheet for details.      Depression Screening (PHQ):  PHQ-2 SCORE: 0  , done 7/31/2024   Last [place] Suicide Screening on 8/2/2024 was No Risk.       Advanced Directives:   She does have a Living Will but we do NOT have it on file in Epic.    She does have a POA but we do NOT have it on file in [employer].    Patient has Advance Care Planning documents but we do not have a copy in EMR. Discussed Advanced Care Planning with patient and instructed patient to get our office a copy to be scanned into EMR.      Patient Active Problem List   Diagnosis    Bilateral impacted cerumen    Depression    Bilateral hand pain    Migraine without aura and without status migrainosus, not intractable    Age-related osteoporosis without current pathological fracture     Allergies:  She has No Known Allergies.    Current Medications:  Outpatient Medications Marked as Taking for the 8/2/24 encounter (Office Visit) with Neyda Escobar MD   Medication Sig    SUMAtriptan 50 MG Oral Tab Take 1 tab po at onset of headache. Ok to repeat dose 2 hours later if needed. Max  dose is 2 doses in 24 hours.    buPROPion  MG Oral Tablet 24 Hr Take 1 tablet (300 mg total) by mouth every morning.    Calcium Carbonate 1500 (600 Ca) MG Oral Tab Take 600 mg by mouth daily with breakfast.    Cholecalciferol (VITAMIN D) 50 MCG (2000 UT) Oral Tab Take 1 tablet by mouth 3 (three) times a week.    Multiple Vitamins-Minerals (MULTI VITAMIN/MINERALS) Oral Tab Take by mouth.    metroNIDAZOLE 0.75 % External Gel Apply topically as needed.       Medical History:  She  has no past medical history on file.  Surgical History:  She  has no past surgical history on file.   Family History:  Her family history includes Cancer in her father; Depression in her father, mother, sister, and sister; Musculo-skelatal Disorder in her mother.  Social History:  She  reports that she has never smoked. She has never used smokeless tobacco. She reports current alcohol use. She reports that she does not use drugs.    Tobacco:  She has never smoked tobacco.    CAGE Alcohol Screen:   CAGE screening score of 0 on 7/31/2024, showing low risk of alcohol abuse.      Patient Care Team:  Neyda Escobar MD as PCP - General (Internal Medicine)  Kaila Freeman PT as Physical Therapist    Review of Systems   Constitutional:  Negative for fever.   HENT:  Negative for congestion.    Eyes:  Negative for visual disturbance.   Respiratory:  Negative for shortness of breath.    Cardiovascular:  Negative for chest pain.   Gastrointestinal:  Negative for constipation.   Genitourinary:  Negative for dysuria.   Neurological: Negative.    Hematological: Negative.    Psychiatric/Behavioral: Negative.           Objective:   Physical Exam  Vitals reviewed.   Constitutional:       General: She is not in acute distress.     Appearance: She is well-developed.   HENT:      Head: Normocephalic and atraumatic.      Right Ear: There is impacted cerumen.      Left Ear: There is impacted cerumen.   Eyes:      Conjunctiva/sclera: Conjunctivae normal.    Cardiovascular:      Rate and Rhythm: Normal rate and regular rhythm.      Heart sounds: Normal heart sounds.   Pulmonary:      Effort: Pulmonary effort is normal.      Breath sounds: Normal breath sounds.   Abdominal:      Palpations: Abdomen is soft.      Tenderness: There is no abdominal tenderness.   Musculoskeletal:      Cervical back: Neck supple.      Right lower leg: No edema.      Left lower leg: No edema.   Lymphadenopathy:      Cervical: No cervical adenopathy.   Skin:     General: Skin is warm and dry.   Neurological:      General: No focal deficit present.      Mental Status: She is alert.   Psychiatric:         Mood and Affect: Mood normal.           /70   Pulse 64   Temp 97.8 °F (36.6 °C) (Temporal)   Resp 16   Ht 5' 6.25\" (1.683 m)   Wt 110 lb 9.6 oz (50.2 kg)   SpO2 93%   BMI 17.72 kg/m²  Estimated body mass index is 17.72 kg/m² as calculated from the following:    Height as of this encounter: 5' 6.25\" (1.683 m).    Weight as of this encounter: 110 lb 9.6 oz (50.2 kg).    Medicare Hearing Assessment:   Hearing Screening    Time taken: 8/2/2024 10:28 AM  Entry User: Tova Loco  Screening Method: Finger Rub  Finger Rub Result: Pass         Visual Acuity:   Right Eye Visual Acuity: Corrected Right Eye Chart Acuity: 20/30   Left Eye Visual Acuity: Corrected Left Eye Chart Acuity: 20/40   Both Eyes Visual Acuity: Corrected Both Eyes Chart Acuity: 20/30   Able To Tolerate Visual Acuity: Yes        Assessment & Plan:   Kateryna Benítez is a 64 year old female who presents for a Medicare Assessment.     1. Wellness examination (Primary)  -up to date with mammo and pap. Mammo due at the end of the month. Colonoscopy due 12/2025  -flu and covid vaccine this fall  2. Age-related osteoporosis without current pathological fracture -she wanted to check dexa for 2 year follow up for re-eval. She exercises regularly.   -     Vitamin D; Future; Expected date: 08/02/2024  3. Screening mammogram  for breast cancer  -     [facility] 2D+3D SCREENING BILAT (CPT=77067/52556); Future; Expected date: 08/30/2024  4. Screening for lipoid disorders  -     Lipid Panel; Future; Expected date: 08/02/2024  5. Fatigue, unspecified type -check labs   -     CBC With Differential With Platelet; Future; Expected date: 08/02/2024  -     Comp Metabolic Panel (14); Future; Expected date: 08/02/2024  -     TSH W Reflex To Free T4; Future; Expected date: 08/02/2024  6. Screening for colon cancer -due in 12/2024 for colonoscopy. Referred to GI  -     Gastro Referral - In Network  7. Migraine without aura and without status migrainosus, not intractable -chronic, stable.   -     SUMAtriptan Succinate; Take 1 tab po at onset of headache. Ok to repeat dose 2 hours later if needed. Max dose is 2 doses in 24 hours.  Dispense: 8 tablet; Refill: 3  8. Bilateral impacted cerumen -chronic, s/p flushing. L ear cerumen persists; f/u ENT   9. Bilateral hand pain - chronic,stable. Related to arthritis. She saw occupational therapy and was given home exercises  10. Major depressive disorder with single episode, in full remission (HCC) -chronic, stable. Cont bupropion     The patient indicates understanding of these issues and agrees to the plan.  Reinforced healthy diet, lifestyle, and exercise.      No follow-ups on file.     Neyda Escobar MD, 8/2/2024     Supplementary Documentation:   General Health:  In the past six months, have you lost more than 10 pounds without trying?: 2 - No  Has your appetite been poor?: No  Type of Diet: Balanced  How does the patient maintain a good energy level?: Appropriate Exercise;Daily Walks;Stretching  How would you describe your daily physical activity?: Moderate  How would you describe your current health state?: Good  How do you maintain positive mental well-being?: Social Interaction  On a scale of 0 to 10, with 0 being no pain and 10 being severe pain, what is your pain level?: 1 - (Mild)  In the past six  months, have you experienced urine leakage?: 0-No  At any time do you feel concerned for the safety/well-being of yourself and/or your children, in your home or elsewhere?: No  Have you had any immunizations at another office such as Influenza, Hepatitis B, Tetanus, or Pneumococcal?: No    Health Maintenance   Topic Date Due    Zoster Vaccines (1 of 2) Never done    MA Annual Health Assessment  01/01/2024    Annual Depression Screening  01/01/2024    COVID-19 Vaccine (7 - 2023-24 season) 03/17/2024    Mammogram  08/30/2024    Colorectal Cancer Screening  12/15/2024    Influenza Vaccine (1) 10/01/2024    DEXA Scan  Completed    Fall Risk Screening (Annual)  Completed    Pneumococcal Vaccine: 65+ Years  Completed

## 2024-10-23 ENCOUNTER — OFFICE VISIT (OUTPATIENT)
Facility: LOCATION | Age: 64
End: 2024-10-23
Payer: MEDICARE

## 2024-10-23 DIAGNOSIS — H61.23 CERUMEN DEBRIS ON TYMPANIC MEMBRANE OF BOTH EARS: Primary | ICD-10-CM

## 2024-10-23 PROCEDURE — 1159F MED LIST DOCD IN RCRD: CPT | Performed by: OTOLARYNGOLOGY

## 2024-10-23 PROCEDURE — 99202 OFFICE O/P NEW SF 15 MIN: CPT | Performed by: OTOLARYNGOLOGY

## 2024-10-23 PROCEDURE — 1160F RVW MEDS BY RX/DR IN RCRD: CPT | Performed by: OTOLARYNGOLOGY

## 2024-10-23 NOTE — PROGRESS NOTES
Kateryna Benítez is a 64 year old female.   Chief Complaint   Patient presents with    Cerumen Impaction     HPI:   History of wax impactions.  They were able to get the wax out of the right ear but they were having difficulties with the left ear.  She complains of decreased hearing associated.  Current Outpatient Medications   Medication Sig Dispense Refill    SUMAtriptan 50 MG Oral Tab Take 1 tab po at onset of headache. Ok to repeat dose 2 hours later if needed. Max dose is 2 doses in 24 hours. 8 tablet 3    buPROPion  MG Oral Tablet 24 Hr Take 1 tablet (300 mg total) by mouth every morning. 90 tablet 3    Calcium Carbonate 1500 (600 Ca) MG Oral Tab Take 600 mg by mouth daily with breakfast.      Cholecalciferol (VITAMIN D) 50 MCG (2000 UT) Oral Tab Take 1 tablet by mouth 3 (three) times a week.      Multiple Vitamins-Minerals (MULTI VITAMIN/MINERALS) Oral Tab Take by mouth.      metroNIDAZOLE 0.75 % External Gel Apply topically as needed.        History reviewed. No pertinent past medical history.   Social History:  Social History     Socioeconomic History    Marital status:    Tobacco Use    Smoking status: Never    Smokeless tobacco: Never   Vaping Use    Vaping status: Never Used   Substance and Sexual Activity    Alcohol use: Yes     Comment: occ    Drug use: Never     Social Drivers of Health      Received from [employer] [facility], [employer] [facility]    Financial Resource Strain   Food Insecurity: Not on File (2/6/2021)    Received from [employer]    Food Insecurity     Food: 0   Transportation Needs: Not on File (2/6/2021)    Received from [facility]    Transportation Needs     Transportation: 0   Physical Activity: Not on File (2/6/2021)    Received from [facility] [employer]    Physical Activity     Physical Activity: 0   Stress: Not on File (2/6/2021)    Received from [employer]    Stress     Stress: 0    Received from [email or web address]  [facility] & [facility], [facility] & [facility]    Social Connections   Housing Stability: Not on File (2021)    [facility]     Housin      History reviewed. No pertinent surgical history.      REVIEW OF SYSTEMS:   GENERAL HEALTH: feels well otherwise  GENERAL : denies fever, chills, sweats, weight loss, weight gain  SKIN: denies any unusual skin lesions or rashes  RESPIRATORY: denies shortness of breath with exertion  NEURO: denies headaches    EXAM:   There were no vitals taken for this visit.    System Findings Details   Constitutional  Overall appearance - Normal.   Psychiatric  Orientation - Oriented to time, place, person & situation. Appropriate mood and affect.   Head/Face  Facial features -- Normal. Skull - Normal.   Eyes  Pupils equal ,round ,react to light and accomidate   Ears, Nose, Throat, Neck  Both ears seen under the microscope.  The right ear is clear no infection the left ear shows wax impaction removed today without difficulty otherwise ears clear   Neurological  Memory - Normal. Cranial nerves - Cranial nerves II through XII grossly intact.   Lymph Detail  Submental. Submandibular. Anterior cervical. Posterior cervical. Supraclavicular.       ASSESSMENT AND PLAN:   1. Cerumen debris on tympanic membrane of both ears  Status post removal from the left ear.  She will see us back as needed.      The patient indicates understanding of these issues and agrees to the plan.    No follow-ups on file.    Abdi Lares MD  10/23/2024  9:21 AM

## 2024-11-01 ENCOUNTER — LAB ENCOUNTER (OUTPATIENT)
Dept: LAB | Age: 64
End: 2024-11-01
Attending: INTERNAL MEDICINE
Payer: MEDICARE

## 2024-11-01 DIAGNOSIS — R73.01 ELEVATED FASTING GLUCOSE: Primary | ICD-10-CM

## 2024-11-01 DIAGNOSIS — M81.0 AGE-RELATED OSTEOPOROSIS WITHOUT CURRENT PATHOLOGICAL FRACTURE: ICD-10-CM

## 2024-11-01 DIAGNOSIS — R53.83 FATIGUE, UNSPECIFIED TYPE: ICD-10-CM

## 2024-11-01 DIAGNOSIS — R73.01 ELEVATED FASTING GLUCOSE: ICD-10-CM

## 2024-11-01 DIAGNOSIS — Z13.220 SCREENING FOR LIPOID DISORDERS: ICD-10-CM

## 2024-11-01 LAB
ALBUMIN SERPL-MCNC: 4.6 G/DL (ref 3.2–4.8)
ALBUMIN/GLOB SERPL: 1.7 {RATIO} (ref 1–2)
ALP LIVER SERPL-CCNC: 84 U/L
ALT SERPL-CCNC: 24 U/L
ANION GAP SERPL CALC-SCNC: 7 MMOL/L (ref 0–18)
AST SERPL-CCNC: 31 U/L (ref ?–34)
BASOPHILS # BLD AUTO: 0.02 X10(3) UL (ref 0–0.2)
BASOPHILS NFR BLD AUTO: 0.5 %
BILIRUB SERPL-MCNC: 0.6 MG/DL (ref 0.2–1.1)
BUN BLD-MCNC: 15 MG/DL (ref 9–23)
CALCIUM BLD-MCNC: 9.8 MG/DL (ref 8.7–10.4)
CHLORIDE SERPL-SCNC: 106 MMOL/L (ref 98–112)
CHOLEST SERPL-MCNC: 188 MG/DL (ref ?–200)
CO2 SERPL-SCNC: 28 MMOL/L (ref 21–32)
CREAT BLD-MCNC: 1 MG/DL
EGFRCR SERPLBLD CKD-EPI 2021: 62 ML/MIN/1.73M2 (ref 60–?)
EOSINOPHIL # BLD AUTO: 0.09 X10(3) UL (ref 0–0.7)
EOSINOPHIL NFR BLD AUTO: 2.2 %
ERYTHROCYTE [DISTWIDTH] IN BLOOD BY AUTOMATED COUNT: 12.5 %
EST. AVERAGE GLUCOSE BLD GHB EST-MCNC: 114 MG/DL (ref 68–126)
FASTING PATIENT LIPID ANSWER: YES
FASTING STATUS PATIENT QL REPORTED: YES
GLOBULIN PLAS-MCNC: 2.7 G/DL (ref 2–3.5)
GLUCOSE BLD-MCNC: 108 MG/DL (ref 70–99)
HBA1C MFR BLD: 5.6 % (ref ?–5.7)
HCT VFR BLD AUTO: 40.9 %
HDLC SERPL-MCNC: 92 MG/DL (ref 40–59)
HGB BLD-MCNC: 13.9 G/DL
IMM GRANULOCYTES # BLD AUTO: 0 X10(3) UL (ref 0–1)
IMM GRANULOCYTES NFR BLD: 0 %
LDLC SERPL CALC-MCNC: 79 MG/DL (ref ?–100)
LYMPHOCYTES # BLD AUTO: 1.45 X10(3) UL (ref 1–4)
LYMPHOCYTES NFR BLD AUTO: 34.8 %
MCH RBC QN AUTO: 31.2 PG (ref 26–34)
MCHC RBC AUTO-ENTMCNC: 34 G/DL (ref 31–37)
MCV RBC AUTO: 91.7 FL
MONOCYTES # BLD AUTO: 0.51 X10(3) UL (ref 0.1–1)
MONOCYTES NFR BLD AUTO: 12.2 %
NEUTROPHILS # BLD AUTO: 2.1 X10 (3) UL (ref 1.5–7.7)
NEUTROPHILS # BLD AUTO: 2.1 X10(3) UL (ref 1.5–7.7)
NEUTROPHILS NFR BLD AUTO: 50.3 %
NONHDLC SERPL-MCNC: 96 MG/DL (ref ?–130)
OSMOLALITY SERPL CALC.SUM OF ELEC: 293 MOSM/KG (ref 275–295)
PLATELET # BLD AUTO: 218 10(3)UL (ref 150–450)
POTASSIUM SERPL-SCNC: 4.5 MMOL/L (ref 3.5–5.1)
PROT SERPL-MCNC: 7.3 G/DL (ref 5.7–8.2)
RBC # BLD AUTO: 4.46 X10(6)UL
SODIUM SERPL-SCNC: 141 MMOL/L (ref 136–145)
TRIGL SERPL-MCNC: 98 MG/DL (ref 30–149)
TSI SER-ACNC: 4.37 UIU/ML (ref 0.55–4.78)
VIT D+METAB SERPL-MCNC: 47.2 NG/ML (ref 30–100)
VLDLC SERPL CALC-MCNC: 15 MG/DL (ref 0–30)
WBC # BLD AUTO: 4.2 X10(3) UL (ref 4–11)

## 2024-11-01 PROCEDURE — 85025 COMPLETE CBC W/AUTO DIFF WBC: CPT

## 2024-11-01 PROCEDURE — 80053 COMPREHEN METABOLIC PANEL: CPT

## 2024-11-01 PROCEDURE — 83036 HEMOGLOBIN GLYCOSYLATED A1C: CPT

## 2024-11-01 PROCEDURE — 84443 ASSAY THYROID STIM HORMONE: CPT

## 2024-11-01 PROCEDURE — 36415 COLL VENOUS BLD VENIPUNCTURE: CPT

## 2024-11-01 PROCEDURE — 82306 VITAMIN D 25 HYDROXY: CPT

## 2024-11-01 PROCEDURE — 80061 LIPID PANEL: CPT

## 2024-11-04 RX ORDER — BUPROPION HYDROCHLORIDE 300 MG/1
300 TABLET ORAL EVERY MORNING
Qty: 90 TABLET | Refills: 3 | Status: SHIPPED | OUTPATIENT
Start: 2024-11-04

## 2024-11-04 NOTE — TELEPHONE ENCOUNTER
Protocol passed     Bupropion ER 300mg       LOV: 8/2/24   RTC: none noted   Filled: 11/10/23 #90 3 refills   Labs: 11/1/24   Future Appointments   Date Time Provider Department Center   11/4/2024  3:40 PM [facility]

## 2024-11-15 ENCOUNTER — HOSPITAL ENCOUNTER (OUTPATIENT)
Dept: MAMMOGRAPHY | Age: 64
Discharge: HOME | End: 2024-11-15
Attending: INTERNAL MEDICINE
Payer: MEDICARE

## 2024-11-15 DIAGNOSIS — Z12.31 SCREENING MAMMOGRAM FOR BREAST CANCER: ICD-10-CM

## 2024-11-15 PROCEDURE — 77067 SCR MAMMO BI INCL CAD: CPT | Performed by: INTERNAL MEDICINE

## 2024-11-15 PROCEDURE — 77063 BREAST TOMOSYNTHESIS BI: CPT | Performed by: INTERNAL MEDICINE

## 2025-08-06 ENCOUNTER — TELEPHONE (OUTPATIENT)
Dept: INTERNAL MEDICINE CLINIC | Facility: CLINIC | Age: 65
End: 2025-08-06

## 2025-08-06 ENCOUNTER — OFFICE VISIT (OUTPATIENT)
Dept: INTERNAL MEDICINE CLINIC | Facility: CLINIC | Age: 65
End: 2025-08-06

## 2025-08-06 VITALS
DIASTOLIC BLOOD PRESSURE: 60 MMHG | BODY MASS INDEX: 17.74 KG/M2 | WEIGHT: 110.38 LBS | OXYGEN SATURATION: 99 % | RESPIRATION RATE: 16 BRPM | TEMPERATURE: 98 F | SYSTOLIC BLOOD PRESSURE: 90 MMHG | HEIGHT: 66 IN | HEART RATE: 66 BPM

## 2025-08-06 DIAGNOSIS — M81.0 AGE-RELATED OSTEOPOROSIS WITHOUT CURRENT PATHOLOGICAL FRACTURE: ICD-10-CM

## 2025-08-06 DIAGNOSIS — F32.5 MAJOR DEPRESSIVE DISORDER WITH SINGLE EPISODE, IN FULL REMISSION: ICD-10-CM

## 2025-08-06 DIAGNOSIS — G43.009 MIGRAINE WITHOUT AURA AND WITHOUT STATUS MIGRAINOSUS, NOT INTRACTABLE: ICD-10-CM

## 2025-08-06 DIAGNOSIS — Z12.31 SCREENING MAMMOGRAM FOR BREAST CANCER: ICD-10-CM

## 2025-08-06 DIAGNOSIS — Z00.00 WELLNESS EXAMINATION: Primary | ICD-10-CM

## 2025-08-06 DIAGNOSIS — H61.22 IMPACTED CERUMEN OF LEFT EAR: ICD-10-CM

## 2025-08-06 DIAGNOSIS — R73.01 ELEVATED FASTING GLUCOSE: ICD-10-CM

## 2025-08-06 DIAGNOSIS — Z12.11 SCREENING FOR COLON CANCER: ICD-10-CM

## 2025-08-06 DIAGNOSIS — R10.11 RIGHT UPPER QUADRANT PAIN: ICD-10-CM

## 2025-08-06 PROBLEM — M79.641 BILATERAL HAND PAIN: Status: RESOLVED | Noted: 2023-07-29 | Resolved: 2025-08-06

## 2025-08-06 PROBLEM — M79.642 BILATERAL HAND PAIN: Status: RESOLVED | Noted: 2023-07-29 | Resolved: 2025-08-06

## 2025-08-06 PROCEDURE — 1159F MED LIST DOCD IN RCRD: CPT | Performed by: INTERNAL MEDICINE

## 2025-08-06 PROCEDURE — 1160F RVW MEDS BY RX/DR IN RCRD: CPT | Performed by: INTERNAL MEDICINE

## 2025-08-06 PROCEDURE — G0439 PPPS, SUBSEQ VISIT: HCPCS | Performed by: INTERNAL MEDICINE

## 2025-08-06 PROCEDURE — 3008F BODY MASS INDEX DOCD: CPT | Performed by: INTERNAL MEDICINE

## 2025-08-06 PROCEDURE — 96160 PT-FOCUSED HLTH RISK ASSMT: CPT | Performed by: INTERNAL MEDICINE

## 2025-08-06 PROCEDURE — 1170F FXNL STATUS ASSESSED: CPT | Performed by: INTERNAL MEDICINE

## 2025-08-06 PROCEDURE — 1125F AMNT PAIN NOTED PAIN PRSNT: CPT | Performed by: INTERNAL MEDICINE

## 2025-08-06 PROCEDURE — 3074F SYST BP LT 130 MM HG: CPT | Performed by: INTERNAL MEDICINE

## 2025-08-06 PROCEDURE — 3078F DIAST BP <80 MM HG: CPT | Performed by: INTERNAL MEDICINE

## 2025-08-21 ENCOUNTER — LAB ENCOUNTER (OUTPATIENT)
Dept: LAB | Age: 65
End: 2025-08-21
Attending: INTERNAL MEDICINE

## 2025-08-21 DIAGNOSIS — R73.01 ELEVATED FASTING GLUCOSE: ICD-10-CM

## 2025-08-21 DIAGNOSIS — M81.0 AGE-RELATED OSTEOPOROSIS WITHOUT CURRENT PATHOLOGICAL FRACTURE: ICD-10-CM

## 2025-08-21 LAB
ALBUMIN SERPL-MCNC: 4.3 G/DL (ref 3.2–4.8)
ALBUMIN/GLOB SERPL: 1.7 (ref 1–2)
ALP LIVER SERPL-CCNC: 84 U/L (ref 55–142)
ALT SERPL-CCNC: 28 U/L (ref 10–49)
ANION GAP SERPL CALC-SCNC: 10 MMOL/L (ref 0–18)
AST SERPL-CCNC: 32 U/L (ref ?–34)
BILIRUB SERPL-MCNC: 0.5 MG/DL (ref 0.2–1.1)
BUN BLD-MCNC: 17 MG/DL (ref 9–23)
CALCIUM BLD-MCNC: 9.8 MG/DL (ref 8.7–10.6)
CHLORIDE SERPL-SCNC: 106 MMOL/L (ref 98–112)
CO2 SERPL-SCNC: 26 MMOL/L (ref 21–32)
CREAT BLD-MCNC: 1.02 MG/DL (ref 0.55–1.02)
EGFRCR SERPLBLD CKD-EPI 2021: 60 ML/MIN/1.73M2 (ref 60–?)
EST. AVERAGE GLUCOSE BLD GHB EST-MCNC: 120 MG/DL (ref 68–126)
FASTING STATUS PATIENT QL REPORTED: NO
GLOBULIN PLAS-MCNC: 2.6 G/DL (ref 2–3.5)
GLUCOSE BLD-MCNC: 96 MG/DL (ref 70–99)
HBA1C MFR BLD: 5.8 % (ref ?–5.7)
OSMOLALITY SERPL CALC.SUM OF ELEC: 295 MOSM/KG (ref 275–295)
POTASSIUM SERPL-SCNC: 4.4 MMOL/L (ref 3.5–5.1)
PROT SERPL-MCNC: 6.9 G/DL (ref 5.7–8.2)
SODIUM SERPL-SCNC: 142 MMOL/L (ref 136–145)

## 2025-08-21 PROCEDURE — 36415 COLL VENOUS BLD VENIPUNCTURE: CPT

## 2025-08-21 PROCEDURE — 80053 COMPREHEN METABOLIC PANEL: CPT

## 2025-08-21 PROCEDURE — 83036 HEMOGLOBIN GLYCOSYLATED A1C: CPT
